# Patient Record
Sex: MALE | Race: WHITE | NOT HISPANIC OR LATINO | Employment: UNEMPLOYED | URBAN - METROPOLITAN AREA
[De-identification: names, ages, dates, MRNs, and addresses within clinical notes are randomized per-mention and may not be internally consistent; named-entity substitution may affect disease eponyms.]

---

## 2017-02-13 ENCOUNTER — TELEPHONE (OUTPATIENT)
Dept: INTERNAL MEDICINE | Facility: CLINIC | Age: 67
End: 2017-02-13

## 2017-02-13 ENCOUNTER — OFFICE VISIT (OUTPATIENT)
Dept: INTERNAL MEDICINE | Facility: CLINIC | Age: 67
End: 2017-02-13

## 2017-02-13 VITALS
HEIGHT: 66 IN | HEART RATE: 96 BPM | SYSTOLIC BLOOD PRESSURE: 116 MMHG | DIASTOLIC BLOOD PRESSURE: 64 MMHG | WEIGHT: 156.8 LBS | OXYGEN SATURATION: 98 % | BODY MASS INDEX: 25.2 KG/M2

## 2017-02-13 DIAGNOSIS — I10 BENIGN ESSENTIAL HYPERTENSION: Primary | ICD-10-CM

## 2017-02-13 DIAGNOSIS — N18.9 CHRONIC KIDNEY DISEASE, UNSPECIFIED STAGE: ICD-10-CM

## 2017-02-13 DIAGNOSIS — M17.11 OSTEOARTHRITIS OF RIGHT KNEE, UNSPECIFIED OSTEOARTHRITIS TYPE: ICD-10-CM

## 2017-02-13 DIAGNOSIS — L98.9 SKIN LESION: ICD-10-CM

## 2017-02-13 PROCEDURE — 99213 OFFICE O/P EST LOW 20 MIN: CPT | Performed by: INTERNAL MEDICINE

## 2017-02-13 PROCEDURE — G0008 ADMIN INFLUENZA VIRUS VAC: HCPCS | Performed by: INTERNAL MEDICINE

## 2017-02-13 PROCEDURE — 90686 IIV4 VACC NO PRSV 0.5 ML IM: CPT | Performed by: INTERNAL MEDICINE

## 2017-02-13 RX ORDER — OMEPRAZOLE 20 MG/1
20 CAPSULE, DELAYED RELEASE ORAL DAILY
Qty: 180 CAPSULE | Refills: 1 | Status: SHIPPED | OUTPATIENT
Start: 2017-02-13 | End: 2017-02-13 | Stop reason: SDUPTHER

## 2017-02-13 RX ORDER — AMLODIPINE BESYLATE 5 MG/1
1 TABLET ORAL DAILY
COMMUNITY
Start: 2016-11-22 | End: 2017-02-13 | Stop reason: SDUPTHER

## 2017-02-13 RX ORDER — MELOXICAM 7.5 MG/1
7.5 TABLET ORAL DAILY
Qty: 90 TABLET | Refills: 1 | Status: SHIPPED | OUTPATIENT
Start: 2017-02-13 | End: 2017-08-08 | Stop reason: SDUPTHER

## 2017-02-13 RX ORDER — OMEPRAZOLE 20 MG/1
20 CAPSULE, DELAYED RELEASE ORAL DAILY
Qty: 90 CAPSULE | Refills: 1 | Status: SHIPPED | OUTPATIENT
Start: 2017-02-13 | End: 2017-08-08 | Stop reason: SDUPTHER

## 2017-02-13 RX ORDER — DIAZEPAM 2 MG/1
2 TABLET ORAL AS NEEDED
Refills: 0
Start: 2017-02-13

## 2017-02-13 RX ORDER — AMLODIPINE BESYLATE 5 MG/1
5 TABLET ORAL DAILY
Qty: 90 TABLET | Refills: 1 | Status: SHIPPED | OUTPATIENT
Start: 2017-02-13 | End: 2017-08-08 | Stop reason: SDUPTHER

## 2017-02-13 NOTE — PROGRESS NOTES
"Hypertension    Subjective   Anoop Morrison is a 66 y.o. male is here today for follow-up.    History of Present Illness   Anoop is accompanied by his sister, who reports he has been doing well. No recent illness. Also followed by Dr. jacosb for hsis Schizophrenia.  She does note a dark lesion on his abdominal wall and would like to have it checked.  BP has been stable at home.    Current Outpatient Prescriptions:   •  fluticasone (FLONASE) 50 MCG/ACT nasal spray, 2 sprays into each nostril daily., Disp: 1 each, Rfl: 5  •  meloxicam (MOBIC) 7.5 MG tablet, Take 1 tablet by mouth Daily., Disp: 90 tablet, Rfl: 1  •  thioridazine (MELLARIL) 50 MG tablet, , Disp: , Rfl:   •  amLODIPine (NORVASC) 5 MG tablet, Take 1 tablet by mouth Daily., Disp: 90 tablet, Rfl: 1  •  diazePAM (VALIUM) 2 MG tablet, Take 1 tablet by mouth As Needed for anxiety., Disp: , Rfl: 0  •  omeprazole (priLOSEC) 20 MG capsule, Take 1 capsule by mouth Daily., Disp: 90 capsule, Rfl: 1      The following portions of the patient's history were reviewed and updated as appropriate: allergies, current medications, past family history, past medical history, past social history, past surgical history and problem list.    Review of Systems   Constitutional: Negative.  Negative for chills and fever.   HENT: Negative for ear discharge and ear pain.    Respiratory: Negative for cough, chest tightness and shortness of breath.    Cardiovascular: Negative for chest pain, palpitations and leg swelling.   Gastrointestinal: Negative for diarrhea, nausea and vomiting.   Musculoskeletal: Negative for arthralgias, back pain and myalgias.   Skin:        Skin lesion   Neurological: Negative for dizziness, syncope and headaches.   Psychiatric/Behavioral: Negative for confusion and sleep disturbance. The patient is nervous/anxious.        Objective   Visit Vitals   • /64   • Pulse 96   • Ht 66\" (167.6 cm)   • Wt 156 lb 12.8 oz (71.1 kg)   • SpO2 98%  Comment: ra   • " BMI 25.31 kg/m2     Physical Exam   Constitutional: He is oriented to person, place, and time. He appears well-developed and well-nourished.   HENT:   Head: Normocephalic and atraumatic.   Mouth/Throat: No oropharyngeal exudate.   Eyes: Conjunctivae are normal. Pupils are equal, round, and reactive to light.   Neck: Neck supple. No thyromegaly present.   Cardiovascular: Normal rate and regular rhythm.  Exam reveals no friction rub.    No murmur heard.  Pulmonary/Chest: Effort normal and breath sounds normal. He has no wheezes. He has no rales.   Abdominal: Soft. Bowel sounds are normal. He exhibits no distension. There is no tenderness.   Neurological: He is alert and oriented to person, place, and time. No cranial nerve deficit.   Skin: Skin is warm and dry.   1cm dark raised skin tag like lesion    Psychiatric: He has a normal mood and affect. Judgment normal.   Nursing note and vitals reviewed.        Results for orders placed or performed in visit on 08/22/16   Comprehensive metabolic panel   Result Value Ref Range    Glucose 101 (H) 70 - 100 mg/dL    BUN 11 9 - 23 mg/dL    Creatinine 1.20 0.60 - 1.30 mg/dL    Sodium 130 (L) 132 - 146 mmol/L    Potassium 4.2 3.5 - 5.5 mmol/L    Chloride 90 (L) 99 - 109 mmol/L    CO2 35.0 (H) 20.0 - 31.0 mmol/L    Calcium 9.8 8.7 - 10.4 mg/dL    Total Protein 7.2 5.7 - 8.2 g/dL    Albumin 4.50 3.20 - 4.80 g/dL    ALT (SGPT) 8 7 - 40 U/L    AST (SGOT) 13 0 - 33 U/L    Alkaline Phosphatase 70 25 - 100 U/L    Total Bilirubin 0.4 0.3 - 1.2 mg/dL    eGFR Non African Amer 61 >60 mL/min/1.73    Globulin 2.7 gm/dL    A/G Ratio 1.7 g/dL    BUN/Creatinine Ratio 9.2 7.0 - 25.0    Anion Gap 5.0 3.0 - 11.0 mmol/L   CBC (No diff)   Result Value Ref Range    WBC 6.26 3.50 - 10.80 10*3/mm3    RBC 4.28 4.20 - 5.76 10*6/mm3    Hemoglobin 12.9 (L) 13.1 - 17.5 g/dL    Hematocrit 37.4 (L) 38.9 - 50.9 %    MCV 87.4 80.0 - 99.0 fL    MCH 30.1 27.0 - 31.0 pg    MCHC 34.5 32.0 - 36.0 g/dL    RDW 12.8  11.3 - 14.5 %    RDW-SD 40.9 37.0 - 54.0 fl    MPV 9.2 6.0 - 12.0 fL    Platelets 274 150 - 450 10*3/mm3   TSH   Result Value Ref Range    TSH 2.101 0.350 - 5.350 mIU/mL             Assessment/Plan   Diagnoses and all orders for this visit:    Benign essential hypertension  -     amLODIPine (NORVASC) 5 MG tablet; Take 1 tablet by mouth Daily.    Osteoarthritis of right knee, unspecified osteoarthritis type  -     meloxicam (MOBIC) 7.5 MG tablet; Take 1 tablet by mouth Daily.    Chronic kidney disease, unspecified stage    Skin lesion  -     Ambulatory Referral to Dermatology    Other orders  -     Discontinue: amLODIPine (NORVASC) 5 MG tablet; Take 1 tablet by mouth Daily.  -     Flu Vaccine Split Quad  -     Discontinue: omeprazole (priLOSEC) 20 MG capsule; Take 1 capsule by mouth Daily.  -     diazePAM (VALIUM) 2 MG tablet; Take 1 tablet by mouth As Needed for anxiety.    Abdominal wall lesion, worrisome for Melanoma. Proceed with derm referral.             Return in about 6 months (around 8/13/2017) for Next scheduled follow up.

## 2017-02-13 NOTE — TELEPHONE ENCOUNTER
Received a call from [facility] pharmacy wanting to clarify wether the pt is supposed to be taking 1 tablet once a day of Omeprazole or did Dr. Ulrich meant to write it 1 tablet twice a day, Dr. Sandoval pls clarify sig for Omeprazole.

## 2017-03-22 RX ORDER — FLUTICASONE PROPIONATE 50 MCG
SPRAY, SUSPENSION (ML) NASAL
Qty: 16 G | Refills: 3 | Status: SHIPPED | OUTPATIENT
Start: 2017-03-22 | End: 2017-08-08 | Stop reason: SDUPTHER

## 2017-08-08 ENCOUNTER — OFFICE VISIT (OUTPATIENT)
Dept: INTERNAL MEDICINE | Facility: CLINIC | Age: 67
End: 2017-08-08

## 2017-08-08 VITALS
DIASTOLIC BLOOD PRESSURE: 68 MMHG | BODY MASS INDEX: 27.28 KG/M2 | OXYGEN SATURATION: 98 % | HEART RATE: 74 BPM | WEIGHT: 169 LBS | SYSTOLIC BLOOD PRESSURE: 116 MMHG

## 2017-08-08 DIAGNOSIS — N18.9 CHRONIC KIDNEY DISEASE, UNSPECIFIED STAGE: ICD-10-CM

## 2017-08-08 DIAGNOSIS — Z11.59 NEED FOR HEPATITIS C SCREENING TEST: ICD-10-CM

## 2017-08-08 DIAGNOSIS — M17.11 OSTEOARTHRITIS OF RIGHT KNEE, UNSPECIFIED OSTEOARTHRITIS TYPE: ICD-10-CM

## 2017-08-08 DIAGNOSIS — J30.9 ALLERGIC RHINITIS, UNSPECIFIED ALLERGIC RHINITIS TRIGGER, UNSPECIFIED RHINITIS SEASONALITY: ICD-10-CM

## 2017-08-08 DIAGNOSIS — K21.9 GASTROESOPHAGEAL REFLUX DISEASE, ESOPHAGITIS PRESENCE NOT SPECIFIED: Primary | ICD-10-CM

## 2017-08-08 DIAGNOSIS — I10 BENIGN ESSENTIAL HYPERTENSION: ICD-10-CM

## 2017-08-08 LAB
ALBUMIN SERPL-MCNC: 4.4 G/DL (ref 3.2–4.8)
ALBUMIN/GLOB SERPL: 1.6 G/DL (ref 1.5–2.5)
ALP SERPL-CCNC: 90 U/L (ref 25–100)
ALT SERPL W P-5'-P-CCNC: 14 U/L (ref 7–40)
ANION GAP SERPL CALCULATED.3IONS-SCNC: 6 MMOL/L (ref 3–11)
AST SERPL-CCNC: 21 U/L (ref 0–33)
BILIRUB SERPL-MCNC: 0.4 MG/DL (ref 0.3–1.2)
BUN BLD-MCNC: 17 MG/DL (ref 9–23)
BUN/CREAT SERPL: 11.3 (ref 7–25)
CALCIUM SPEC-SCNC: 9.7 MG/DL (ref 8.7–10.4)
CHLORIDE SERPL-SCNC: 100 MMOL/L (ref 99–109)
CHOLEST SERPL-MCNC: 191 MG/DL (ref 0–200)
CO2 SERPL-SCNC: 31 MMOL/L (ref 20–31)
CREAT BLD-MCNC: 1.5 MG/DL (ref 0.6–1.3)
DEPRECATED RDW RBC AUTO: 40.6 FL (ref 37–54)
ERYTHROCYTE [DISTWIDTH] IN BLOOD BY AUTOMATED COUNT: 12.5 % (ref 11.3–14.5)
GFR SERPL CREATININE-BSD FRML MDRD: 47 ML/MIN/1.73
GLOBULIN UR ELPH-MCNC: 2.8 GM/DL
GLUCOSE BLD-MCNC: 87 MG/DL (ref 70–100)
HCT VFR BLD AUTO: 38.5 % (ref 38.9–50.9)
HCV AB SER DONR QL: NORMAL
HGB BLD-MCNC: 13.3 G/DL (ref 13.1–17.5)
MCH RBC QN AUTO: 30.5 PG (ref 27–31)
MCHC RBC AUTO-ENTMCNC: 34.5 G/DL (ref 32–36)
MCV RBC AUTO: 88.3 FL (ref 80–99)
PLATELET # BLD AUTO: 221 10*3/MM3 (ref 150–450)
PMV BLD AUTO: 10 FL (ref 6–12)
POTASSIUM BLD-SCNC: 4.2 MMOL/L (ref 3.5–5.5)
PROT SERPL-MCNC: 7.2 G/DL (ref 5.7–8.2)
RBC # BLD AUTO: 4.36 10*6/MM3 (ref 4.2–5.76)
SODIUM BLD-SCNC: 137 MMOL/L (ref 132–146)
TSH SERPL DL<=0.05 MIU/L-ACNC: 2.93 MIU/ML (ref 0.35–5.35)
WBC NRBC COR # BLD: 4.94 10*3/MM3 (ref 3.5–10.8)

## 2017-08-08 PROCEDURE — 84443 ASSAY THYROID STIM HORMONE: CPT | Performed by: PHYSICIAN ASSISTANT

## 2017-08-08 PROCEDURE — 85027 COMPLETE CBC AUTOMATED: CPT | Performed by: PHYSICIAN ASSISTANT

## 2017-08-08 PROCEDURE — 99214 OFFICE O/P EST MOD 30 MIN: CPT | Performed by: PHYSICIAN ASSISTANT

## 2017-08-08 PROCEDURE — 82465 ASSAY BLD/SERUM CHOLESTEROL: CPT | Performed by: PHYSICIAN ASSISTANT

## 2017-08-08 PROCEDURE — 80053 COMPREHEN METABOLIC PANEL: CPT | Performed by: PHYSICIAN ASSISTANT

## 2017-08-08 PROCEDURE — 86803 HEPATITIS C AB TEST: CPT | Performed by: PHYSICIAN ASSISTANT

## 2017-08-08 RX ORDER — FLUTICASONE PROPIONATE 50 MCG
2 SPRAY, SUSPENSION (ML) NASAL DAILY
Qty: 16 G | Refills: 5 | Status: SHIPPED | OUTPATIENT
Start: 2017-08-08

## 2017-08-08 RX ORDER — MELOXICAM 7.5 MG/1
7.5 TABLET ORAL DAILY
Qty: 90 TABLET | Refills: 1 | Status: SHIPPED | OUTPATIENT
Start: 2017-08-08 | End: 2018-02-20 | Stop reason: SDUPTHER

## 2017-08-08 RX ORDER — AMLODIPINE BESYLATE 5 MG/1
5 TABLET ORAL DAILY
Qty: 90 TABLET | Refills: 1 | Status: SHIPPED | OUTPATIENT
Start: 2017-08-08 | End: 2018-02-20 | Stop reason: SDUPTHER

## 2017-08-08 RX ORDER — OMEPRAZOLE 20 MG/1
20 CAPSULE, DELAYED RELEASE ORAL DAILY
Qty: 90 CAPSULE | Refills: 1 | Status: SHIPPED | OUTPATIENT
Start: 2017-08-08 | End: 2018-02-20 | Stop reason: SDUPTHER

## 2017-08-08 NOTE — PROGRESS NOTES
Chief Complaint   Patient presents with   • Follow-up     hypertension       Subjective   Anoop Morrison is a 66 y.o. male.       History of Present Illness     Pt is stable on current medications. Taking amlodipine as directed. No HAs, chest pain, shortness of breath or palps.     Needs refill of flonase, uses it most days to help with allergic rhinitis and nasal congestion.    Still seeing his psychiatrist regularly and stable on meds. Does not get out of his apartment much except for office visits. Walks some for exercise.    Current Outpatient Prescriptions:   •  amLODIPine (NORVASC) 5 MG tablet, Take 1 tablet by mouth Daily., Disp: 90 tablet, Rfl: 1  •  fluticasone (FLONASE) 50 MCG/ACT nasal spray, 2 sprays into each nostril Daily., Disp: 16 g, Rfl: 5  •  meloxicam (MOBIC) 7.5 MG tablet, Take 1 tablet by mouth Daily., Disp: 90 tablet, Rfl: 1  •  omeprazole (priLOSEC) 20 MG capsule, Take 1 capsule by mouth Daily., Disp: 90 capsule, Rfl: 1  •  diazePAM (VALIUM) 2 MG tablet, Take 1 tablet by mouth As Needed for anxiety., Disp: , Rfl: 0  •  thioridazine (MELLARIL) 50 MG tablet, , Disp: , Rfl:      PMFSH  The following portions of the patient's history were reviewed and updated as appropriate: allergies, current medications, past family history, past medical history, past social history, past surgical history and problem list.    Review of Systems   Constitutional: Negative for appetite change, fever and unexpected weight change.   HENT: Negative for ear pain, facial swelling and sore throat.    Eyes: Negative for pain and visual disturbance.   Respiratory: Negative for chest tightness, shortness of breath and wheezing.    Cardiovascular: Negative for chest pain and palpitations.   Gastrointestinal: Negative for abdominal pain and blood in stool.   Endocrine: Negative.    Genitourinary: Negative for difficulty urinating and hematuria.   Musculoskeletal: Negative for joint swelling.   Neurological: Negative for  tremors, seizures and syncope.   Hematological: Negative for adenopathy.   Psychiatric/Behavioral: Negative.        Objective   /68  Pulse 74  Wt 169 lb (76.7 kg)  SpO2 98%  BMI 27.28 kg/m2    Physical Exam   Constitutional: He appears well-developed and well-nourished.   HENT:   Head: Normocephalic.   Right Ear: Hearing, tympanic membrane, external ear and ear canal normal.   Left Ear: Hearing, tympanic membrane, external ear and ear canal normal.   Nose: Nose normal.   Mouth/Throat: Oropharynx is clear and moist.   Eyes: Conjunctivae are normal. Pupils are equal, round, and reactive to light.   Neck: Normal range of motion.   Cardiovascular: Normal rate, regular rhythm and normal heart sounds.    Pulmonary/Chest: Effort normal and breath sounds normal. He has no decreased breath sounds. He has no wheezes. He has no rhonchi. He has no rales.   Musculoskeletal: Normal range of motion.   Neurological: He is alert.   Skin: Skin is warm and dry.   Psychiatric: He has a normal mood and affect. His behavior is normal.   Nursing note and vitals reviewed.      Results for orders placed or performed in visit on 08/08/17   Comprehensive Metabolic Panel   Result Value Ref Range    Glucose 87 70 - 100 mg/dL    BUN 17 9 - 23 mg/dL    Creatinine 1.50 (H) 0.60 - 1.30 mg/dL    Sodium 137 132 - 146 mmol/L    Potassium 4.2 3.5 - 5.5 mmol/L    Chloride 100 99 - 109 mmol/L    CO2 31.0 20.0 - 31.0 mmol/L    Calcium 9.7 8.7 - 10.4 mg/dL    Total Protein 7.2 5.7 - 8.2 g/dL    Albumin 4.40 3.20 - 4.80 g/dL    ALT (SGPT) 14 7 - 40 U/L    AST (SGOT) 21 0 - 33 U/L    Alkaline Phosphatase 90 25 - 100 U/L    Total Bilirubin 0.4 0.3 - 1.2 mg/dL    eGFR Non African Amer 47 (L) >60 mL/min/1.73    Globulin 2.8 gm/dL    A/G Ratio 1.6 1.5 - 2.5 g/dL    BUN/Creatinine Ratio 11.3 7.0 - 25.0    Anion Gap 6.0 3.0 - 11.0 mmol/L   CBC (No Diff)   Result Value Ref Range    WBC 4.94 3.50 - 10.80 10*3/mm3    RBC 4.36 4.20 - 5.76 10*6/mm3     Hemoglobin 13.3 13.1 - 17.5 g/dL    Hematocrit 38.5 (L) 38.9 - 50.9 %    MCV 88.3 80.0 - 99.0 fL    MCH 30.5 27.0 - 31.0 pg    MCHC 34.5 32.0 - 36.0 g/dL    RDW 12.5 11.3 - 14.5 %    RDW-SD 40.6 37.0 - 54.0 fl    MPV 10.0 6.0 - 12.0 fL    Platelets 221 150 - 450 10*3/mm3   TSH   Result Value Ref Range    TSH 2.927 0.350 - 5.350 mIU/mL   Hepatitis C Antibody   Result Value Ref Range    Hepatitis C Ab Non-Reactive Non-Reactive   Cholesterol, Total   Result Value Ref Range    Total Cholesterol 191 0 - 200 mg/dL        ASSESSMENT/PLAN    Problem List Items Addressed This Visit        Cardiovascular and Mediastinum    Benign essential hypertension    Relevant Medications    amLODIPine (NORVASC) 5 MG tablet    Other Relevant Orders    Comprehensive Metabolic Panel (Completed)    TSH (Completed)    Cholesterol, Total (Completed)       Digestive    Acid reflux - Primary    Relevant Medications    omeprazole (priLOSEC) 20 MG capsule    Other Relevant Orders    CBC (No Diff) (Completed)       Musculoskeletal and Integument    Osteoarthritis of knee    Relevant Medications    meloxicam (MOBIC) 7.5 MG tablet    Other Relevant Orders    CBC (No Diff) (Completed)       Genitourinary    Chronic kidney disease      Other Visit Diagnoses     Allergic rhinitis, unspecified allergic rhinitis trigger, unspecified rhinitis seasonality        Relevant Medications    fluticasone (FLONASE) 50 MCG/ACT nasal spray    Need for hepatitis C screening test        Relevant Orders    Hepatitis C Antibody (Completed)               Return in about 6 months (around 2/8/2018) for Medicare Wellness with Dr Sandoval.

## 2018-02-20 ENCOUNTER — OFFICE VISIT (OUTPATIENT)
Dept: INTERNAL MEDICINE | Facility: CLINIC | Age: 68
End: 2018-02-20

## 2018-02-20 VITALS
DIASTOLIC BLOOD PRESSURE: 68 MMHG | SYSTOLIC BLOOD PRESSURE: 128 MMHG | BODY MASS INDEX: 29.76 KG/M2 | WEIGHT: 184.4 LBS | OXYGEN SATURATION: 97 % | HEART RATE: 90 BPM

## 2018-02-20 DIAGNOSIS — I10 BENIGN ESSENTIAL HYPERTENSION: ICD-10-CM

## 2018-02-20 DIAGNOSIS — Z12.11 SCREENING FOR COLON CANCER: Primary | ICD-10-CM

## 2018-02-20 DIAGNOSIS — K21.9 GASTROESOPHAGEAL REFLUX DISEASE, ESOPHAGITIS PRESENCE NOT SPECIFIED: ICD-10-CM

## 2018-02-20 DIAGNOSIS — M17.11 OSTEOARTHRITIS OF RIGHT KNEE, UNSPECIFIED OSTEOARTHRITIS TYPE: ICD-10-CM

## 2018-02-20 LAB
ALBUMIN SERPL-MCNC: 4.5 G/DL (ref 3.2–4.8)
ALBUMIN/GLOB SERPL: 2 G/DL (ref 1.5–2.5)
ALP SERPL-CCNC: 102 U/L (ref 25–100)
ALT SERPL W P-5'-P-CCNC: 28 U/L (ref 7–40)
ANION GAP SERPL CALCULATED.3IONS-SCNC: 8 MMOL/L (ref 3–11)
ARTICHOKE IGE QN: 97 MG/DL (ref 0–130)
AST SERPL-CCNC: 23 U/L (ref 0–33)
BILIRUB SERPL-MCNC: 0.3 MG/DL (ref 0.3–1.2)
BUN BLD-MCNC: 18 MG/DL (ref 9–23)
BUN/CREAT SERPL: 12.9 (ref 7–25)
CALCIUM SPEC-SCNC: 9.8 MG/DL (ref 8.7–10.4)
CHLORIDE SERPL-SCNC: 98 MMOL/L (ref 99–109)
CHOLEST SERPL-MCNC: 196 MG/DL (ref 0–200)
CO2 SERPL-SCNC: 32 MMOL/L (ref 20–31)
CREAT BLD-MCNC: 1.4 MG/DL (ref 0.6–1.3)
GFR SERPL CREATININE-BSD FRML MDRD: 51 ML/MIN/1.73
GLOBULIN UR ELPH-MCNC: 2.3 GM/DL
GLUCOSE BLD-MCNC: 107 MG/DL (ref 70–100)
HDLC SERPL-MCNC: 44 MG/DL (ref 40–60)
POTASSIUM BLD-SCNC: 4.2 MMOL/L (ref 3.5–5.5)
PROT SERPL-MCNC: 6.8 G/DL (ref 5.7–8.2)
SODIUM BLD-SCNC: 138 MMOL/L (ref 132–146)
TRIGL SERPL-MCNC: 343 MG/DL (ref 0–150)

## 2018-02-20 PROCEDURE — 80061 LIPID PANEL: CPT | Performed by: INTERNAL MEDICINE

## 2018-02-20 PROCEDURE — 80053 COMPREHEN METABOLIC PANEL: CPT | Performed by: INTERNAL MEDICINE

## 2018-02-20 PROCEDURE — G0008 ADMIN INFLUENZA VIRUS VAC: HCPCS | Performed by: INTERNAL MEDICINE

## 2018-02-20 PROCEDURE — 99214 OFFICE O/P EST MOD 30 MIN: CPT | Performed by: INTERNAL MEDICINE

## 2018-02-20 PROCEDURE — 90662 IIV NO PRSV INCREASED AG IM: CPT | Performed by: INTERNAL MEDICINE

## 2018-02-20 RX ORDER — OMEPRAZOLE 20 MG/1
20 CAPSULE, DELAYED RELEASE ORAL DAILY
Qty: 90 CAPSULE | Refills: 1 | Status: SHIPPED | OUTPATIENT
Start: 2018-02-20

## 2018-02-20 RX ORDER — AMLODIPINE BESYLATE 5 MG/1
5 TABLET ORAL DAILY
Qty: 90 TABLET | Refills: 1 | Status: SHIPPED | OUTPATIENT
Start: 2018-02-20

## 2018-02-20 RX ORDER — MELOXICAM 7.5 MG/1
7.5 TABLET ORAL DAILY
Qty: 90 TABLET | Refills: 1 | Status: SHIPPED | OUTPATIENT
Start: 2018-02-20

## 2018-02-20 NOTE — PROGRESS NOTES
Med Refill    Subjective   Anoop Morrison is a 67 y.o. male is here today for follow-up.    History of Present Illness   Here for refills on his HTN, and arthritis.Doing well on current regimen. Not fasting today.  Takes his valium only on days he has to come outside.  Seen by [facility] Xie for his Psych meds.  Needs his flu shot.      Current Outpatient Prescriptions:   •  amLODIPine (NORVASC) 5 MG tablet, Take 1 tablet by mouth Daily., Disp: 90 tablet, Rfl: 1  •  diazePAM (VALIUM) 2 MG tablet, Take 1 tablet by mouth As Needed for anxiety., Disp: , Rfl: 0  •  fluticasone (FLONASE) 50 MCG/ACT nasal spray, 2 sprays into each nostril Daily., Disp: 16 g, Rfl: 5  •  meloxicam (MOBIC) 7.5 MG tablet, Take 1 tablet by mouth Daily., Disp: 90 tablet, Rfl: 1  •  omeprazole (priLOSEC) 20 MG capsule, Take 1 capsule by mouth Daily., Disp: 90 capsule, Rfl: 1  •  thioridazine (MELLARIL) 50 MG tablet, , Disp: , Rfl:       The following portions of the patient's history were reviewed and updated as appropriate: allergies, current medications, past family history, past medical history, past social history, past surgical history and problem list.    Review of Systems   Constitutional: Negative.  Negative for chills and fever.   HENT: Negative for ear discharge, ear pain, sinus pressure and sore throat.    Respiratory: Negative for cough, chest tightness and shortness of breath.    Cardiovascular: Negative for chest pain, palpitations and leg swelling.   Gastrointestinal: Positive for abdominal pain. Negative for diarrhea, nausea and vomiting.   Musculoskeletal: Positive for arthralgias and gait problem. Negative for back pain and myalgias.   Neurological: Negative for dizziness, syncope and headaches.   Psychiatric/Behavioral: Negative for confusion and sleep disturbance.       Objective   /68 (BP Location: Right arm, Patient Position: Sitting, Cuff Size: Adult)  Pulse 90  Wt 83.6 kg (184 lb 6.4 oz)  SpO2 97%  BMI 29.76  kg/m2  Physical Exam   Constitutional: He is oriented to person, place, and time. He appears well-developed and well-nourished.   HENT:   Head: Normocephalic and atraumatic.   Mouth/Throat: No oropharyngeal exudate.   Eyes: Conjunctivae are normal. Pupils are equal, round, and reactive to light.   Neck: Neck supple. No thyromegaly present.   Cardiovascular: Normal rate and regular rhythm.    Pulmonary/Chest: Effort normal and breath sounds normal.   Abdominal: Soft. Bowel sounds are normal. He exhibits no distension. There is no tenderness.   Musculoskeletal: He exhibits no edema.   Neurological: He is alert and oriented to person, place, and time. No cranial nerve deficit.   Skin: Skin is warm and dry.   Psychiatric: He has a normal mood and affect. Judgment normal.   Nursing note and vitals reviewed.        Results for orders placed or performed in visit on 02/20/18   Comprehensive Metabolic Panel   Result Value Ref Range    Glucose 107 (H) 70 - 100 mg/dL    BUN 18 9 - 23 mg/dL    Creatinine 1.40 (H) 0.60 - 1.30 mg/dL    Sodium 138 132 - 146 mmol/L    Potassium 4.2 3.5 - 5.5 mmol/L    Chloride 98 (L) 99 - 109 mmol/L    CO2 32.0 (H) 20.0 - 31.0 mmol/L    Calcium 9.8 8.7 - 10.4 mg/dL    Total Protein 6.8 5.7 - 8.2 g/dL    Albumin 4.50 3.20 - 4.80 g/dL    ALT (SGPT) 28 7 - 40 U/L    AST (SGOT) 23 0 - 33 U/L    Alkaline Phosphatase 102 (H) 25 - 100 U/L    Total Bilirubin 0.3 0.3 - 1.2 mg/dL    eGFR Non African Amer 51 (L) >60 mL/min/1.73    Globulin 2.3 gm/dL    A/G Ratio 2.0 1.5 - 2.5 g/dL    BUN/Creatinine Ratio 12.9 7.0 - 25.0    Anion Gap 8.0 3.0 - 11.0 mmol/L   Lipid Panel   Result Value Ref Range    Total Cholesterol 196 0 - 200 mg/dL    Triglycerides 343 (H) 0 - 150 mg/dL    HDL Cholesterol 44 40 - 60 mg/dL    LDL Cholesterol  97 0 - 130 mg/dL             Assessment/Plan   Diagnoses and all orders for this visit:    Screening for colon cancer  -     Cologuard - Stool, Per Rectum    Benign essential  hypertension  -     amLODIPine (NORVASC) 5 MG tablet; Take 1 tablet by mouth Daily.  -     Comprehensive Metabolic Panel  -     Lipid Panel    Osteoarthritis of right knee, unspecified osteoarthritis type  -     meloxicam (MOBIC) 7.5 MG tablet; Take 1 tablet by mouth Daily.    Gastroesophageal reflux disease, esophagitis presence not specified  -     omeprazole (priLOSEC) 20 MG capsule; Take 1 capsule by mouth Daily.    Other orders  -     Flu Vaccine High Dose PF 65YR+ ([id number])               Needs pneumovax in august  Return in about 6 months (around 8/20/2018) for Medicare Wellness.

## 2025-03-25 ENCOUNTER — TRANSCRIBE ORDERS (OUTPATIENT)
Dept: ADMINISTRATIVE | Facility: HOSPITAL | Age: 75
End: 2025-03-25
Payer: MEDICARE

## 2025-03-25 DIAGNOSIS — R91.1 LUNG NODULE: Primary | ICD-10-CM

## 2025-03-29 ENCOUNTER — APPOINTMENT (OUTPATIENT)
Dept: CT IMAGING | Facility: HOSPITAL | Age: 75
End: 2025-03-29
Payer: MEDICARE

## 2025-03-29 ENCOUNTER — APPOINTMENT (OUTPATIENT)
Dept: GENERAL RADIOLOGY | Facility: HOSPITAL | Age: 75
End: 2025-03-29
Payer: MEDICARE

## 2025-03-29 ENCOUNTER — HOSPITAL ENCOUNTER (INPATIENT)
Facility: HOSPITAL | Age: 75
LOS: 3 days | Discharge: NURSING, REHAB OR LONG-TERM CARE | End: 2025-04-01
Attending: EMERGENCY MEDICINE | Admitting: HOSPITALIST
Payer: MEDICARE

## 2025-03-29 DIAGNOSIS — J18.9 PNEUMONIA OF BOTH UPPER LOBES DUE TO INFECTIOUS ORGANISM: Primary | ICD-10-CM

## 2025-03-29 DIAGNOSIS — K52.9 GASTROENTERITIS: ICD-10-CM

## 2025-03-29 DIAGNOSIS — E86.9 VOLUME DEPLETION: ICD-10-CM

## 2025-03-29 DIAGNOSIS — R41.841 COGNITIVE COMMUNICATION DEFICIT: ICD-10-CM

## 2025-03-29 DIAGNOSIS — J30.9 ALLERGIC RHINITIS, UNSPECIFIED SEASONALITY, UNSPECIFIED TRIGGER: ICD-10-CM

## 2025-03-29 DIAGNOSIS — R53.83 LETHARGY: ICD-10-CM

## 2025-03-29 DIAGNOSIS — R41.0 ACUTE CONFUSION: ICD-10-CM

## 2025-03-29 DIAGNOSIS — E87.0 HYPERNATREMIA: ICD-10-CM

## 2025-03-29 PROBLEM — R41.82 AMS (ALTERED MENTAL STATUS): Status: ACTIVE | Noted: 2025-03-29

## 2025-03-29 LAB
ALT SERPL W P-5'-P-CCNC: 10 U/L (ref 1–41)
ANION GAP SERPL CALCULATED.3IONS-SCNC: 13 MMOL/L (ref 5–15)
APTT PPP: 30.2 SECONDS (ref 22–39)
ARTERIAL PATENCY WRIST A: ABNORMAL
AST SERPL-CCNC: 13 U/L (ref 1–40)
ATMOSPHERIC PRESS: ABNORMAL MM[HG]
BASE EXCESS BLDA CALC-SCNC: 7.8 MMOL/L (ref 0–2)
BASOPHILS # BLD AUTO: 0.02 10*3/MM3 (ref 0–0.2)
BASOPHILS NFR BLD AUTO: 0.3 % (ref 0–1.5)
BDY SITE: ABNORMAL
BODY TEMPERATURE: 37
BUN SERPL-MCNC: 36 MG/DL (ref 8–23)
BUN/CREAT SERPL: 37.9 (ref 7–25)
CALCIUM SPEC-SCNC: 8.8 MG/DL (ref 8.6–10.5)
CHLORIDE SERPL-SCNC: 106 MMOL/L (ref 98–107)
CO2 BLDA-SCNC: 37.2 MMOL/L (ref 22–33)
CO2 SERPL-SCNC: 31 MMOL/L (ref 22–29)
COHGB MFR BLD: 1.4 % (ref 0–2)
CREAT SERPL-MCNC: 0.95 MG/DL (ref 0.76–1.27)
D-LACTATE SERPL-SCNC: 1.3 MMOL/L (ref 0.5–2)
D-LACTATE SERPL-SCNC: 2.2 MMOL/L (ref 0.5–2)
DEPRECATED RDW RBC AUTO: 51.7 FL (ref 37–54)
EGFRCR SERPLBLD CKD-EPI 2021: 85 ML/MIN/1.73
EOSINOPHIL # BLD AUTO: 0 10*3/MM3 (ref 0–0.4)
EOSINOPHIL NFR BLD AUTO: 0 % (ref 0.3–6.2)
EPAP: 0
ERYTHROCYTE [DISTWIDTH] IN BLOOD BY AUTOMATED COUNT: 14.9 % (ref 12.3–15.4)
GLUCOSE SERPL-MCNC: 139 MG/DL (ref 65–99)
HCO3 BLDA-SCNC: 35.2 MMOL/L (ref 20–26)
HCT VFR BLD AUTO: 40.1 % (ref 37.5–51)
HCT VFR BLD CALC: 34.8 % (ref 38–51)
HGB BLD-MCNC: 12.4 G/DL (ref 13–17.7)
HGB BLDA-MCNC: 11.3 G/DL (ref 13.5–17.5)
HOLD SPECIMEN: NORMAL
IMM GRANULOCYTES # BLD AUTO: 0.02 10*3/MM3 (ref 0–0.05)
IMM GRANULOCYTES NFR BLD AUTO: 0.3 % (ref 0–0.5)
INHALED O2 CONCENTRATION: 28 %
INR PPP: 1.14 (ref 0.89–1.12)
IPAP: 0
LYMPHOCYTES # BLD AUTO: 0.31 10*3/MM3 (ref 0.7–3.1)
LYMPHOCYTES NFR BLD AUTO: 4.7 % (ref 19.6–45.3)
MCH RBC QN AUTO: 29 PG (ref 26.6–33)
MCHC RBC AUTO-ENTMCNC: 30.9 G/DL (ref 31.5–35.7)
MCV RBC AUTO: 93.9 FL (ref 79–97)
METHGB BLD QL: 0.3 % (ref 0–1.5)
MODALITY: ABNORMAL
MONOCYTES # BLD AUTO: 0.29 10*3/MM3 (ref 0.1–0.9)
MONOCYTES NFR BLD AUTO: 4.4 % (ref 5–12)
NEUTROPHILS NFR BLD AUTO: 5.94 10*3/MM3 (ref 1.7–7)
NEUTROPHILS NFR BLD AUTO: 90.3 % (ref 42.7–76)
NRBC BLD AUTO-RTO: 0 /100 WBC (ref 0–0.2)
OXYHGB MFR BLDV: 95.4 % (ref 94–99)
PAW @ PEAK INSP FLOW SETTING VENT: 0 CMH2O
PCO2 BLDA: 63.4 MM HG (ref 35–45)
PCO2 TEMP ADJ BLD: 63.4 MM HG (ref 35–48)
PH BLDA: 7.35 PH UNITS (ref 7.35–7.45)
PH, TEMP CORRECTED: 7.35 PH UNITS
PLATELET # BLD AUTO: 157 10*3/MM3 (ref 140–450)
PMV BLD AUTO: 10.6 FL (ref 6–12)
PO2 BLDA: 85.2 MM HG (ref 83–108)
PO2 TEMP ADJ BLD: 85.2 MM HG (ref 83–108)
POTASSIUM SERPL-SCNC: 4.7 MMOL/L (ref 3.5–5.2)
PROTHROMBIN TIME: 14.7 SECONDS (ref 12.2–14.5)
QT INTERVAL: 418 MS
QTC INTERVAL: 508 MS
RBC # BLD AUTO: 4.27 10*6/MM3 (ref 4.14–5.8)
SARS-COV-2 AG RESP QL IA.RAPID: NORMAL
SODIUM SERPL-SCNC: 150 MMOL/L (ref 136–145)
TOTAL RATE: 0 BREATHS/MINUTE
VALPROATE SERPL-MCNC: 12.5 MCG/ML (ref 50–125)
WBC NRBC COR # BLD AUTO: 6.58 10*3/MM3 (ref 3.4–10.8)
WHOLE BLOOD HOLD COAG: NORMAL
WHOLE BLOOD HOLD SPECIMEN: NORMAL

## 2025-03-29 PROCEDURE — 82565 ASSAY OF CREATININE: CPT

## 2025-03-29 PROCEDURE — 83605 ASSAY OF LACTIC ACID: CPT | Performed by: EMERGENCY MEDICINE

## 2025-03-29 PROCEDURE — 93005 ELECTROCARDIOGRAM TRACING: CPT | Performed by: EMERGENCY MEDICINE

## 2025-03-29 PROCEDURE — 25010000002 HEPARIN (PORCINE) PER 1000 UNITS: Performed by: INTERNAL MEDICINE

## 2025-03-29 PROCEDURE — 25810000003 SODIUM CHLORIDE 0.9 % SOLUTION: Performed by: INTERNAL MEDICINE

## 2025-03-29 PROCEDURE — 0042T HC CT CEREBRAL PERFUSION W/WO CONTRAST: CPT

## 2025-03-29 PROCEDURE — 25510000001 IOPAMIDOL PER 1 ML: Performed by: EMERGENCY MEDICINE

## 2025-03-29 PROCEDURE — 83050 HGB METHEMOGLOBIN QUAN: CPT

## 2025-03-29 PROCEDURE — 85025 COMPLETE CBC W/AUTO DIFF WBC: CPT | Performed by: EMERGENCY MEDICINE

## 2025-03-29 PROCEDURE — 4A03X5D MEASUREMENT OF ARTERIAL FLOW, INTRACRANIAL, EXTERNAL APPROACH: ICD-10-PCS | Performed by: HOSPITALIST

## 2025-03-29 PROCEDURE — 82375 ASSAY CARBOXYHB QUANT: CPT

## 2025-03-29 PROCEDURE — 99223 1ST HOSP IP/OBS HIGH 75: CPT | Performed by: INTERNAL MEDICINE

## 2025-03-29 PROCEDURE — 84450 TRANSFERASE (AST) (SGOT): CPT | Performed by: EMERGENCY MEDICINE

## 2025-03-29 PROCEDURE — 74177 CT ABD & PELVIS W/CONTRAST: CPT

## 2025-03-29 PROCEDURE — 87426 SARSCOV CORONAVIRUS AG IA: CPT | Performed by: INTERNAL MEDICINE

## 2025-03-29 PROCEDURE — 99285 EMERGENCY DEPT VISIT HI MDM: CPT

## 2025-03-29 PROCEDURE — 71045 X-RAY EXAM CHEST 1 VIEW: CPT

## 2025-03-29 PROCEDURE — 82805 BLOOD GASES W/O2 SATURATION: CPT

## 2025-03-29 PROCEDURE — 84460 ALANINE AMINO (ALT) (SGPT): CPT | Performed by: EMERGENCY MEDICINE

## 2025-03-29 PROCEDURE — 94799 UNLISTED PULMONARY SVC/PX: CPT

## 2025-03-29 PROCEDURE — 70496 CT ANGIOGRAPHY HEAD: CPT

## 2025-03-29 PROCEDURE — 80164 ASSAY DIPROPYLACETIC ACD TOT: CPT | Performed by: INTERNAL MEDICINE

## 2025-03-29 PROCEDURE — 25810000003 SODIUM CHLORIDE 0.9 % SOLUTION: Performed by: EMERGENCY MEDICINE

## 2025-03-29 PROCEDURE — 85610 PROTHROMBIN TIME: CPT | Performed by: EMERGENCY MEDICINE

## 2025-03-29 PROCEDURE — 36600 WITHDRAWAL OF ARTERIAL BLOOD: CPT

## 2025-03-29 PROCEDURE — 36415 COLL VENOUS BLD VENIPUNCTURE: CPT

## 2025-03-29 PROCEDURE — 70498 CT ANGIOGRAPHY NECK: CPT

## 2025-03-29 PROCEDURE — 99222 1ST HOSP IP/OBS MODERATE 55: CPT | Performed by: NURSE PRACTITIONER

## 2025-03-29 PROCEDURE — 85730 THROMBOPLASTIN TIME PARTIAL: CPT | Performed by: EMERGENCY MEDICINE

## 2025-03-29 PROCEDURE — 70450 CT HEAD/BRAIN W/O DYE: CPT

## 2025-03-29 PROCEDURE — 25010000002 CEFTRIAXONE PER 250 MG: Performed by: EMERGENCY MEDICINE

## 2025-03-29 PROCEDURE — 94660 CPAP INITIATION&MGMT: CPT

## 2025-03-29 PROCEDURE — 80048 BASIC METABOLIC PNL TOTAL CA: CPT | Performed by: EMERGENCY MEDICINE

## 2025-03-29 RX ORDER — ASPIRIN 81 MG/1
81 TABLET, CHEWABLE ORAL DAILY
Status: DISCONTINUED | OUTPATIENT
Start: 2025-03-29 | End: 2025-04-01 | Stop reason: HOSPADM

## 2025-03-29 RX ORDER — BISACODYL 10 MG
10 SUPPOSITORY, RECTAL RECTAL DAILY PRN
Status: DISCONTINUED | OUTPATIENT
Start: 2025-03-29 | End: 2025-04-01 | Stop reason: HOSPADM

## 2025-03-29 RX ORDER — BISACODYL 5 MG/1
5 TABLET, DELAYED RELEASE ORAL DAILY PRN
Status: DISCONTINUED | OUTPATIENT
Start: 2025-03-29 | End: 2025-04-01 | Stop reason: HOSPADM

## 2025-03-29 RX ORDER — SODIUM CHLORIDE 0.9 % (FLUSH) 0.9 %
10 SYRINGE (ML) INJECTION EVERY 12 HOURS SCHEDULED
Status: DISCONTINUED | OUTPATIENT
Start: 2025-03-29 | End: 2025-04-01 | Stop reason: HOSPADM

## 2025-03-29 RX ORDER — SODIUM CHLORIDE 0.9 % (FLUSH) 0.9 %
10 SYRINGE (ML) INJECTION AS NEEDED
Status: DISCONTINUED | OUTPATIENT
Start: 2025-03-29 | End: 2025-04-01 | Stop reason: HOSPADM

## 2025-03-29 RX ORDER — ACETAMINOPHEN 650 MG/1
650 SUPPOSITORY RECTAL EVERY 4 HOURS PRN
Status: DISCONTINUED | OUTPATIENT
Start: 2025-03-29 | End: 2025-04-01 | Stop reason: HOSPADM

## 2025-03-29 RX ORDER — IPRATROPIUM BROMIDE AND ALBUTEROL SULFATE 2.5; .5 MG/3ML; MG/3ML
3 SOLUTION RESPIRATORY (INHALATION) EVERY 6 HOURS PRN
Status: DISCONTINUED | OUTPATIENT
Start: 2025-03-29 | End: 2025-03-30 | Stop reason: SDUPTHER

## 2025-03-29 RX ORDER — ACETAMINOPHEN 160 MG/5ML
650 SOLUTION ORAL EVERY 4 HOURS PRN
Status: DISCONTINUED | OUTPATIENT
Start: 2025-03-29 | End: 2025-04-01 | Stop reason: HOSPADM

## 2025-03-29 RX ORDER — HEPARIN SODIUM 5000 [USP'U]/ML
5000 INJECTION, SOLUTION INTRAVENOUS; SUBCUTANEOUS EVERY 8 HOURS SCHEDULED
Status: DISCONTINUED | OUTPATIENT
Start: 2025-03-29 | End: 2025-04-01 | Stop reason: HOSPADM

## 2025-03-29 RX ORDER — ATORVASTATIN CALCIUM 40 MG/1
80 TABLET, FILM COATED ORAL NIGHTLY
Status: DISCONTINUED | OUTPATIENT
Start: 2025-03-29 | End: 2025-04-01 | Stop reason: HOSPADM

## 2025-03-29 RX ORDER — IOPAMIDOL 755 MG/ML
125 INJECTION, SOLUTION INTRAVASCULAR
Status: COMPLETED | OUTPATIENT
Start: 2025-03-29 | End: 2025-03-29

## 2025-03-29 RX ORDER — ACETAMINOPHEN 325 MG/1
650 TABLET ORAL EVERY 4 HOURS PRN
Status: DISCONTINUED | OUTPATIENT
Start: 2025-03-29 | End: 2025-04-01 | Stop reason: HOSPADM

## 2025-03-29 RX ORDER — ASPIRIN 300 MG/1
300 SUPPOSITORY RECTAL DAILY
Status: DISCONTINUED | OUTPATIENT
Start: 2025-03-29 | End: 2025-04-01 | Stop reason: HOSPADM

## 2025-03-29 RX ORDER — SODIUM CHLORIDE 9 MG/ML
75 INJECTION, SOLUTION INTRAVENOUS CONTINUOUS
Status: ACTIVE | OUTPATIENT
Start: 2025-03-29 | End: 2025-03-30

## 2025-03-29 RX ORDER — SODIUM CHLORIDE 9 MG/ML
40 INJECTION, SOLUTION INTRAVENOUS AS NEEDED
Status: DISCONTINUED | OUTPATIENT
Start: 2025-03-29 | End: 2025-04-01 | Stop reason: HOSPADM

## 2025-03-29 RX ORDER — POLYETHYLENE GLYCOL 3350 17 G/17G
17 POWDER, FOR SOLUTION ORAL DAILY PRN
Status: DISCONTINUED | OUTPATIENT
Start: 2025-03-29 | End: 2025-04-01 | Stop reason: HOSPADM

## 2025-03-29 RX ORDER — AMOXICILLIN 250 MG
2 CAPSULE ORAL 2 TIMES DAILY PRN
Status: DISCONTINUED | OUTPATIENT
Start: 2025-03-29 | End: 2025-04-01 | Stop reason: HOSPADM

## 2025-03-29 RX ADMIN — DOXYCYCLINE 100 MG: 100 INJECTION, POWDER, LYOPHILIZED, FOR SOLUTION INTRAVENOUS at 22:40

## 2025-03-29 RX ADMIN — ASPIRIN 300 MG: 300 SUPPOSITORY RECTAL at 20:23

## 2025-03-29 RX ADMIN — IOPAMIDOL 125 ML: 755 INJECTION, SOLUTION INTRAVENOUS at 19:19

## 2025-03-29 RX ADMIN — SODIUM CHLORIDE 1000 ML: 9 INJECTION, SOLUTION INTRAVENOUS at 21:18

## 2025-03-29 RX ADMIN — Medication 10 ML: at 22:43

## 2025-03-29 RX ADMIN — SODIUM CHLORIDE 1000 ML: 9 INJECTION, SOLUTION INTRAVENOUS at 19:44

## 2025-03-29 RX ADMIN — HEPARIN SODIUM 5000 UNITS: 5000 INJECTION INTRAVENOUS; SUBCUTANEOUS at 22:41

## 2025-03-29 RX ADMIN — CEFTRIAXONE 2000 MG: 2 INJECTION, POWDER, FOR SOLUTION INTRAMUSCULAR; INTRAVENOUS at 21:18

## 2025-03-29 RX ADMIN — SODIUM CHLORIDE 75 ML/HR: 9 INJECTION, SOLUTION INTRAVENOUS at 22:38

## 2025-03-29 NOTE — ED PROVIDER NOTES
Subjective   History of Present Illness  Mr. Morrison presents with weakness.  Staff at his facility felt he was more confused than usual.  He has been having vomiting and diarrhea.  Staff reports outbreak of norovirus on his floor.  He reports headache.      Review of Systems    Past Medical History:   Diagnosis Date    Acute sinusitis        Allergies   Allergen Reactions    Other        No past surgical history on file.    Family History   Problem Relation Age of Onset    Arthritis Other     Cancer Other     Heart attack Other     Diabetes Other     Hypertension Other     Stroke Other        Social History     Socioeconomic History    Marital status: Single   Tobacco Use    Smoking status: Never    Smokeless tobacco: Never   Substance and Sexual Activity    Alcohol use: No           Objective   Physical Exam  Vitals and nursing note reviewed.   Constitutional:       General: He is not in acute distress.     Appearance: Normal appearance.   HENT:      Head: Normocephalic and atraumatic.      Nose: Nose normal. No congestion or rhinorrhea.   Eyes:      General: No scleral icterus.     Conjunctiva/sclera: Conjunctivae normal.   Neck:      Comments: No JVD   Cardiovascular:      Rate and Rhythm: Normal rate and regular rhythm.      Heart sounds: No murmur heard.     No friction rub.   Pulmonary:      Effort: Pulmonary effort is normal.      Breath sounds: Normal breath sounds. No wheezing or rales.   Abdominal:      General: Bowel sounds are normal.      Palpations: Abdomen is soft.      Tenderness: There is no abdominal tenderness. There is no guarding or rebound.   Musculoskeletal:         General: No tenderness.      Cervical back: Normal range of motion and neck supple.      Right lower leg: No edema.      Left lower leg: No edema.   Skin:     General: Skin is warm and dry.      Coloration: Skin is not pale.      Findings: No erythema.   Neurological:      General: No focal deficit present.      Mental Status: He is  lethargic.      Motor: No weakness.      Coordination: Coordination normal.      Comments: Contracture of left upper extremity.  He is lethargic, he does answer questions but his speech is difficult to understand.   Psychiatric:         Thought Content: Thought content normal.         Procedures           ED Course  ED Course as of 03/30/25 0013   Sat Mar 29, 2025   1848 Saw him emergently in CT scan due to code stroke page.  Interviewed paramedics.  Evaluated Mr. Morrison.  Conferred with stroke navigator.  He has what appears to be chronic weakness of the left upper extremity which he acknowledges.  He has contracture.  Do not see any acute focal areas of weakness.  Staff reported to EMS that last known well was 9:00 this morning.  Not a candidate therefore for consideration of thrombolysis.  Will proceed with stroke workup.  He is tender in his abdomen, will scan his abdomen and pelvis at the same time [DT]   2017 CT scans unremarkable.  Raise possibility of pneumonia.  Awaiting radiologist interpretation of his chest x-ray.  IV fluids are infusing. [DT]   2046 Has completed fluids.  Saturations have improved.  Currently high 90s on 2 L.  He acknowledges that he does wear oxygen at home.  Tells me his nausea is better.  Does report cough and shortness of breath.  CT scan suggests upper lobe pneumonia as does chest x-ray.  Seems somewhat lethargic.  101/54.  Will give antibiotics for pneumonia as well as additional IV fluids.  Last the hospitalist to admit/observe [DT]      ED Course User Index  [DT] Diego Mcconnell MD                                Total (NIH Stroke Scale): 2                      Medical Decision Making  Ordered and interpreted multiple labs.  Saw him as a code stroke and CT.  Multiple conversations with stroke navigator.  Gave IV medications and had reevaluation and admitted him to the hospital    Problems Addressed:  Acute confusion: complicated acute illness or injury that poses a threat to life  or bodily functions  Gastroenteritis: complicated acute illness or injury  Hypernatremia: complicated acute illness or injury  Lethargy: complicated acute illness or injury that poses a threat to life or bodily functions  Pneumonia of both upper lobes due to infectious organism: complicated acute illness or injury that poses a threat to life or bodily functions  Volume depletion: complicated acute illness or injury that poses a threat to life or bodily functions    Amount and/or Complexity of Data Reviewed  Independent Historian: EMS  Labs: ordered. Decision-making details documented in ED Course.  Radiology: ordered. Decision-making details documented in ED Course.  ECG/medicine tests: ordered. Decision-making details documented in ED Course.    Risk  Prescription drug management.  Decision regarding hospitalization.        Final diagnoses:   Gastroenteritis   Volume depletion   Lethargy   Pneumonia of both upper lobes due to infectious organism   Hypernatremia   Acute confusion       ED Disposition  ED Disposition       ED Disposition   Decision to Admit    Condition   --    Comment   Level of Care: Telemetry [5]   Diagnosis: AMS (altered mental status) [[id number]]   Admitting Physician: CELSA KUMAR [[id number]]   Attending Physician: CELSA KUMAR [[id number]]   Certification: I Certify That Inpatient Hospital Services Are Medically Necessary For Greater Than 2 Midnights                 No follow-up provider specified.       Medication List      No changes were made to your prescriptions during this visit.            Diego Mcconnell MD  03/30/25 0013

## 2025-03-29 NOTE — LETTER
EMS Transport Request  For use at [facility] [place], [place], [place], and [place] only   Patient Name: Anoop Morrison : 1950   Weight:93 kg (205 lb 0.4 oz) [facility] BLS/ALS: BLS/ALS: BLS   Insurance: MEDICARE Auth End Date: NA   Pre-Cert #: D/C Summary complete:    Destination: Other [place] place   Contact Precautions: Other Contact spore for norovirus   Equipment (O2, Fluids, etc.): None   Arrive By Date/Time: this afternoon (still needs to poop before goes) Stretcher/WC: Stretcher   CM Requesting: Daya Wolff, MSW Ext: [phone]   Notes/Medical Necessity: fall risk,      ______________________________________________________________________    *Only 2 patient bags OR 1 carry-on size bag are permitted.  Wheelchairs and walkers CANNOT transported with the patient. Acknowledge: Yes

## 2025-03-30 ENCOUNTER — APPOINTMENT (OUTPATIENT)
Dept: GENERAL RADIOLOGY | Facility: HOSPITAL | Age: 75
End: 2025-03-30
Payer: MEDICARE

## 2025-03-30 ENCOUNTER — APPOINTMENT (OUTPATIENT)
Dept: MRI IMAGING | Facility: HOSPITAL | Age: 75
End: 2025-03-30
Payer: MEDICARE

## 2025-03-30 LAB
AMMONIA BLD-SCNC: 25 UMOL/L (ref 16–60)
ANION GAP SERPL CALCULATED.3IONS-SCNC: 10 MMOL/L (ref 5–15)
ARTERIAL PATENCY WRIST A: POSITIVE
ATMOSPHERIC PRESS: ABNORMAL MM[HG]
BASE EXCESS BLDA CALC-SCNC: 7 MMOL/L (ref 0–2)
BDY SITE: ABNORMAL
BODY TEMPERATURE: 37
BUN SERPL-MCNC: 28 MG/DL (ref 8–23)
BUN/CREAT SERPL: 40 (ref 7–25)
CALCIUM SPEC-SCNC: 7.9 MG/DL (ref 8.6–10.5)
CHLORIDE SERPL-SCNC: 112 MMOL/L (ref 98–107)
CHOLEST SERPL-MCNC: 80 MG/DL (ref 0–200)
CO2 BLDA-SCNC: 36.2 MMOL/L (ref 22–33)
CO2 SERPL-SCNC: 29 MMOL/L (ref 22–29)
COHGB MFR BLD: 1.3 % (ref 0–2)
CREAT SERPL-MCNC: 0.7 MG/DL (ref 0.76–1.27)
DEPRECATED RDW RBC AUTO: 52.4 FL (ref 37–54)
EGFRCR SERPLBLD CKD-EPI 2021: 97.9 ML/MIN/1.73
EPAP: 0
ERYTHROCYTE [DISTWIDTH] IN BLOOD BY AUTOMATED COUNT: 15 % (ref 12.3–15.4)
GLUCOSE BLDC GLUCOMTR-MCNC: 111 MG/DL (ref 70–130)
GLUCOSE BLDC GLUCOMTR-MCNC: 116 MG/DL (ref 70–130)
GLUCOSE BLDC GLUCOMTR-MCNC: 125 MG/DL (ref 70–130)
GLUCOSE BLDC GLUCOMTR-MCNC: 147 MG/DL (ref 70–130)
GLUCOSE BLDC GLUCOMTR-MCNC: 164 MG/DL (ref 70–130)
GLUCOSE SERPL-MCNC: 117 MG/DL (ref 65–99)
HBA1C MFR BLD: 5.7 % (ref 4.8–5.6)
HCO3 BLDA-SCNC: 34.3 MMOL/L (ref 20–26)
HCT VFR BLD AUTO: 36 % (ref 37.5–51)
HCT VFR BLD CALC: 34.7 % (ref 38–51)
HDLC SERPL-MCNC: 30 MG/DL (ref 40–60)
HGB BLD-MCNC: 11 G/DL (ref 13–17.7)
HGB BLDA-MCNC: 11.3 G/DL (ref 13.5–17.5)
INHALED O2 CONCENTRATION: 38 %
IPAP: 0
LDLC SERPL CALC-MCNC: 24 MG/DL (ref 0–100)
LDLC/HDLC SERPL: 0.63 {RATIO}
MCH RBC QN AUTO: 29.2 PG (ref 26.6–33)
MCHC RBC AUTO-ENTMCNC: 30.6 G/DL (ref 31.5–35.7)
MCV RBC AUTO: 95.5 FL (ref 79–97)
METHGB BLD QL: 0.4 % (ref 0–1.5)
MODALITY: ABNORMAL
NT-PROBNP SERPL-MCNC: 245 PG/ML (ref 0–900)
OXYHGB MFR BLDV: 98.1 % (ref 94–99)
PAW @ PEAK INSP FLOW SETTING VENT: 0 CMH2O
PCO2 BLDA: 62 MM HG (ref 35–45)
PCO2 TEMP ADJ BLD: 62 MM HG (ref 35–48)
PH BLDA: 7.35 PH UNITS (ref 7.35–7.45)
PH, TEMP CORRECTED: 7.35 PH UNITS
PLATELET # BLD AUTO: 108 10*3/MM3 (ref 140–450)
PMV BLD AUTO: 10.1 FL (ref 6–12)
PO2 BLDA: 148 MM HG (ref 83–108)
PO2 TEMP ADJ BLD: 148 MM HG (ref 83–108)
POTASSIUM SERPL-SCNC: 4.2 MMOL/L (ref 3.5–5.2)
PROCALCITONIN SERPL-MCNC: 0.24 NG/ML (ref 0–0.25)
RBC # BLD AUTO: 3.77 10*6/MM3 (ref 4.14–5.8)
SODIUM SERPL-SCNC: 146 MMOL/L (ref 136–145)
SODIUM SERPL-SCNC: 147 MMOL/L (ref 136–145)
SODIUM SERPL-SCNC: 151 MMOL/L (ref 136–145)
TOTAL RATE: 0 BREATHS/MINUTE
TRIGL SERPL-MCNC: 156 MG/DL (ref 0–150)
TSH SERPL DL<=0.05 MIU/L-ACNC: 0.93 UIU/ML (ref 0.27–4.2)
VENTILATOR MODE: ABNORMAL
VLDLC SERPL-MCNC: 26 MG/DL (ref 5–40)
WBC NRBC COR # BLD AUTO: 3.7 10*3/MM3 (ref 3.4–10.8)

## 2025-03-30 PROCEDURE — 94640 AIRWAY INHALATION TREATMENT: CPT

## 2025-03-30 PROCEDURE — 99232 SBSQ HOSP IP/OBS MODERATE 35: CPT | Performed by: HOSPITALIST

## 2025-03-30 PROCEDURE — 94761 N-INVAS EAR/PLS OXIMETRY MLT: CPT

## 2025-03-30 PROCEDURE — 80048 BASIC METABOLIC PNL TOTAL CA: CPT | Performed by: INTERNAL MEDICINE

## 2025-03-30 PROCEDURE — 70551 MRI BRAIN STEM W/O DYE: CPT

## 2025-03-30 PROCEDURE — 84295 ASSAY OF SERUM SODIUM: CPT | Performed by: INTERNAL MEDICINE

## 2025-03-30 PROCEDURE — 36600 WITHDRAWAL OF ARTERIAL BLOOD: CPT

## 2025-03-30 PROCEDURE — 83050 HGB METHEMOGLOBIN QUAN: CPT

## 2025-03-30 PROCEDURE — 82805 BLOOD GASES W/O2 SATURATION: CPT

## 2025-03-30 PROCEDURE — 80061 LIPID PANEL: CPT | Performed by: NURSE PRACTITIONER

## 2025-03-30 PROCEDURE — 84443 ASSAY THYROID STIM HORMONE: CPT | Performed by: INTERNAL MEDICINE

## 2025-03-30 PROCEDURE — 71045 X-RAY EXAM CHEST 1 VIEW: CPT

## 2025-03-30 PROCEDURE — 82948 REAGENT STRIP/BLOOD GLUCOSE: CPT

## 2025-03-30 PROCEDURE — 82375 ASSAY CARBOXYHB QUANT: CPT

## 2025-03-30 PROCEDURE — 25810000003 LACTATED RINGERS PER 1000 ML: Performed by: HOSPITALIST

## 2025-03-30 PROCEDURE — 25010000002 CEFTRIAXONE PER 250 MG: Performed by: INTERNAL MEDICINE

## 2025-03-30 PROCEDURE — 83036 HEMOGLOBIN GLYCOSYLATED A1C: CPT | Performed by: NURSE PRACTITIONER

## 2025-03-30 PROCEDURE — 92610 EVALUATE SWALLOWING FUNCTION: CPT

## 2025-03-30 PROCEDURE — 83880 ASSAY OF NATRIURETIC PEPTIDE: CPT | Performed by: INTERNAL MEDICINE

## 2025-03-30 PROCEDURE — 94799 UNLISTED PULMONARY SVC/PX: CPT

## 2025-03-30 PROCEDURE — 85027 COMPLETE CBC AUTOMATED: CPT | Performed by: INTERNAL MEDICINE

## 2025-03-30 PROCEDURE — 84145 PROCALCITONIN (PCT): CPT | Performed by: INTERNAL MEDICINE

## 2025-03-30 PROCEDURE — 25010000002 HEPARIN (PORCINE) PER 1000 UNITS: Performed by: INTERNAL MEDICINE

## 2025-03-30 PROCEDURE — 94660 CPAP INITIATION&MGMT: CPT

## 2025-03-30 PROCEDURE — 94664 DEMO&/EVAL PT USE INHALER: CPT

## 2025-03-30 PROCEDURE — 82140 ASSAY OF AMMONIA: CPT | Performed by: INTERNAL MEDICINE

## 2025-03-30 PROCEDURE — 92523 SPEECH SOUND LANG COMPREHEN: CPT

## 2025-03-30 RX ORDER — IPRATROPIUM BROMIDE AND ALBUTEROL SULFATE 2.5; .5 MG/3ML; MG/3ML
3 SOLUTION RESPIRATORY (INHALATION) EVERY 4 HOURS PRN
Status: DISCONTINUED | OUTPATIENT
Start: 2025-03-30 | End: 2025-04-01 | Stop reason: HOSPADM

## 2025-03-30 RX ORDER — INSULIN LISPRO 100 [IU]/ML
2-7 INJECTION, SOLUTION INTRAVENOUS; SUBCUTANEOUS
Status: DISCONTINUED | OUTPATIENT
Start: 2025-03-30 | End: 2025-04-01 | Stop reason: HOSPADM

## 2025-03-30 RX ORDER — DEXTROSE MONOHYDRATE 25 G/50ML
25 INJECTION, SOLUTION INTRAVENOUS
Status: DISCONTINUED | OUTPATIENT
Start: 2025-03-30 | End: 2025-04-01 | Stop reason: HOSPADM

## 2025-03-30 RX ORDER — SODIUM CHLORIDE 9 MG/ML
75 INJECTION, SOLUTION INTRAVENOUS CONTINUOUS
Status: DISCONTINUED | OUTPATIENT
Start: 2025-03-30 | End: 2025-03-30

## 2025-03-30 RX ORDER — GUAIFENESIN 600 MG/1
600 TABLET, EXTENDED RELEASE ORAL EVERY 12 HOURS SCHEDULED
Status: DISCONTINUED | OUTPATIENT
Start: 2025-03-30 | End: 2025-04-01 | Stop reason: HOSPADM

## 2025-03-30 RX ORDER — SODIUM CHLORIDE, SODIUM LACTATE, POTASSIUM CHLORIDE, CALCIUM CHLORIDE 600; 310; 30; 20 MG/100ML; MG/100ML; MG/100ML; MG/100ML
75 INJECTION, SOLUTION INTRAVENOUS CONTINUOUS
Status: ACTIVE | OUTPATIENT
Start: 2025-03-30 | End: 2025-03-31

## 2025-03-30 RX ORDER — SODIUM CHLORIDE FOR INHALATION 7 %
4 VIAL, NEBULIZER (ML) INHALATION
Status: DISCONTINUED | OUTPATIENT
Start: 2025-03-30 | End: 2025-03-30

## 2025-03-30 RX ORDER — IPRATROPIUM BROMIDE AND ALBUTEROL SULFATE 2.5; .5 MG/3ML; MG/3ML
3 SOLUTION RESPIRATORY (INHALATION)
Status: DISCONTINUED | OUTPATIENT
Start: 2025-03-30 | End: 2025-04-01 | Stop reason: HOSPADM

## 2025-03-30 RX ORDER — IBUPROFEN 600 MG/1
1 TABLET ORAL
Status: DISCONTINUED | OUTPATIENT
Start: 2025-03-30 | End: 2025-04-01 | Stop reason: HOSPADM

## 2025-03-30 RX ORDER — NICOTINE POLACRILEX 4 MG
15 LOZENGE BUCCAL
Status: DISCONTINUED | OUTPATIENT
Start: 2025-03-30 | End: 2025-04-01 | Stop reason: HOSPADM

## 2025-03-30 RX ADMIN — GUAIFENESIN 600 MG: 600 TABLET, EXTENDED RELEASE ORAL at 21:21

## 2025-03-30 RX ADMIN — IPRATROPIUM BROMIDE AND ALBUTEROL SULFATE 3 ML: 2.5; .5 SOLUTION RESPIRATORY (INHALATION) at 10:05

## 2025-03-30 RX ADMIN — Medication 10 ML: at 21:21

## 2025-03-30 RX ADMIN — SODIUM CHLORIDE, SODIUM LACTATE, POTASSIUM CHLORIDE, CALCIUM CHLORIDE 75 ML/HR: 20; 30; 600; 310 INJECTION, SOLUTION INTRAVENOUS at 10:45

## 2025-03-30 RX ADMIN — IPRATROPIUM BROMIDE AND ALBUTEROL SULFATE 3 ML: 2.5; .5 SOLUTION RESPIRATORY (INHALATION) at 18:56

## 2025-03-30 RX ADMIN — Medication 10 ML: at 08:42

## 2025-03-30 RX ADMIN — ACETAMINOPHEN 650 MG: 325 TABLET, FILM COATED ORAL at 23:00

## 2025-03-30 RX ADMIN — CEFTRIAXONE 2000 MG: 2 INJECTION, POWDER, FOR SOLUTION INTRAMUSCULAR; INTRAVENOUS at 21:20

## 2025-03-30 RX ADMIN — ATORVASTATIN CALCIUM 80 MG: 40 TABLET, FILM COATED ORAL at 21:20

## 2025-03-30 RX ADMIN — GUAIFENESIN 600 MG: 600 TABLET, EXTENDED RELEASE ORAL at 10:43

## 2025-03-30 RX ADMIN — DOXYCYCLINE 100 MG: 100 INJECTION, POWDER, LYOPHILIZED, FOR SOLUTION INTRAVENOUS at 08:41

## 2025-03-30 RX ADMIN — IPRATROPIUM BROMIDE AND ALBUTEROL SULFATE 3 ML: 2.5; .5 SOLUTION RESPIRATORY (INHALATION) at 14:30

## 2025-03-30 RX ADMIN — HEPARIN SODIUM 5000 UNITS: 5000 INJECTION INTRAVENOUS; SUBCUTANEOUS at 21:21

## 2025-03-30 RX ADMIN — HEPARIN SODIUM 5000 UNITS: 5000 INJECTION INTRAVENOUS; SUBCUTANEOUS at 05:11

## 2025-03-30 RX ADMIN — DOXYCYCLINE 100 MG: 100 INJECTION, POWDER, LYOPHILIZED, FOR SOLUTION INTRAVENOUS at 21:20

## 2025-03-30 RX ADMIN — HEPARIN SODIUM 5000 UNITS: 5000 INJECTION INTRAVENOUS; SUBCUTANEOUS at 15:56

## 2025-03-30 NOTE — PROGRESS NOTES
[facility] Medicine Services  PROGRESS NOTE    Patient Name: Anoop Morrison  : 1950  MRN: [id number]    Date of Admission: 3/29/2025  Primary Care Physician: Anjelica Sandoval MD    Subjective   Subjective     CC: AMS    HPI: Remains altered. No complaints. Denies pain.     Objective   Objective     Vital Signs:   Temp:  [97.8 °F (36.6 °C)-98.1 °F (36.7 °C)] 97.8 °F (36.6 °C)  Heart Rate:  [81-91] 81  Resp:  [16] 16  BP: (109-132)/(63-72) 109/65  Flow (L/min) (Oxygen Therapy):  [2] 2     Physical Exam:  NAD, alert on NIPPV  OP clear, dry MM  Neck supple  RRR  Decreased at bases  +BS, soft  L UE contractures  Slight dysarthria  Flat affect    Results Reviewed:  LAB RESULTS:      Lab 25   WBC  --  6.58   HEMOGLOBIN  --  12.4*   HEMATOCRIT  --  40.1   PLATELETS  --  157   NEUTROS ABS  --  5.94   IMMATURE GRANS (ABS)  --  0.02   LYMPHS ABS  --  0.31*   MONOS ABS  --  0.29   EOS ABS  --  0.00   MCV  --  93.9   LACTATE 1.3 2.2*   PROTIME  --  14.7*   APTT  --  30.2         Lab 25  0142 25  1853   SODIUM 147* 150*   POTASSIUM  --  4.7   CHLORIDE  --  106   CO2  --  31.0*   ANION GAP  --  13.0   BUN  --  36*   CREATININE  --  0.95   EGFR  --  85.0   GLUCOSE  --  139*   CALCIUM  --  8.8         Lab 25  1853   ALT (SGPT) 10   AST (SGOT) 13         Lab 25  1853   PROTIME 14.7*   INR 1.14*                 Lab 25  0416 25  2300   PH, ARTERIAL 7.352 7.353   PCO2, ARTERIAL 62.0* 63.4*   PO2 .0* 85.2   FIO2 38 28   HCO3 ART 34.3* 35.2*   BASE EXCESS ART 7.0* 7.8*   CARBOXYHEMOGLOBIN 1.3 1.4     Brief Urine Lab Results       None            Microbiology Results Abnormal       None            XR Chest 1 View  Result Date: 3/30/2025  XR CHEST 1 VW Date of Exam: 3/30/2025 2:31 AM EDT Indication: possible pneumonia, known pulmonary fibrosis Comparison: 3/29/2025 Findings: Cardiomegaly is unchanged. There is continued scarring in the  upper lobes. There is improved aeration at the left lung base. No pneumothorax is seen.     Impression: 1.Improved aeration at the left lung base. 2.Stable scarring in the upper lobes. Electronically Signed: Kota Nunez MD  3/30/2025 5:31 AM EDT  Workstation ID: [id number]    XR Chest 1 View  Result Date: 3/29/2025  XR CHEST 1 VW Date of Exam: 3/29/2025 7:58 PM EDT Indication: Acute Stroke Protocol (onset < 12 hrs) Comparison: None available. Findings: Heart is enlarged. There is atherosclerotic disease in the aorta. Pulmonary vascularity is normal. There is some mild scarring/atelectasis/infiltrate in the lower left lung. There is also probably some scarring in the left upper lobe and right upper lobe. No pneumothorax. Consider upright PA and lateral chest x-ray when clinically feasible.     Impression: 1.Cardiomegaly. 2.Areas of scarring/atelectasis/infiltrate in the lower left lung. 3.Probable scarring in the upper lobes. Electronically Signed: Kota Nunez MD  3/29/2025 8:30 PM EDT  Workstation ID: [id number]    CT Angiogram Head w AI Analysis of LVO  Result Date: 3/29/2025  CT ANGIOGRAM HEAD W AI ANALYSIS OF LVO, CT ANGIOGRAM NECK Date of Exam: 3/29/2025 6:46 PM EDT Indication: Neuro Deficit, acute, Stroke suspected Neuro deficit, acute stroke suspected. Comparison: None available. Technique: CTA of the head and neck was performed after the uneventful intravenous administration of 125 cc Isovue-370. Reconstructed coronal and sagittal images were also obtained. In addition, a 3-D volume rendered image was created for interpretation.  Automated exposure control and iterative reconstruction methods were used. Findings: CTA NECK: *Aortic arch: No aneurysm. No significant stenosis or occlusion of the major arch vessels. *Left carotid system: No aneurysm, significant stenosis or occlusion. *Right carotid system: No aneurysm, significant stenosis or occlusion. *Vertebrobasilar system: The vertebral arteries arise  from their respective subclavian arteries. No aneurysm, significant stenosis or occlusion. *Mosaic attenuation of the lung apices. Correlate for pulmonary infectious or inflammatory process. CTA HEAD: *Anterior circulation: No aneurysm, significant stenosis or occlusion. 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 *Posterior circulation: No aneurysm, significant stenosis or occlusion. *Anatomic variants: None of significance. *Venous sinuses: Patent.     Impression: 1.No hemodynamically significant stenosis, large vessel cut off or aneurysms in the intracranial circulation 2.No hemodynamically significant stenosis, dissection or aneurysms in the extracranial circulation. 3.Mosaic attenuation of the upper lungs which may reflect underlying infectious or inflammatory process. Electronically Signed: Diego Martinez MD  3/29/2025 7:38 PM EDT  Workstation ID: [id number]    CT Angiogram Neck  Result Date: 3/29/2025  CT ANGIOGRAM HEAD W AI ANALYSIS OF LVO, CT ANGIOGRAM NECK Date of Exam: 3/29/2025 6:46 PM EDT Indication: Neuro Deficit, acute, Stroke suspected Neuro deficit, acute stroke suspected. Comparison: None available. Technique: CTA of the head and neck was performed after the uneventful intravenous administration of 125 cc Isovue-370. Reconstructed coronal and sagittal images were also obtained. In addition, a 3-D volume rendered image was created for interpretation.  Automated exposure control and iterative reconstruction methods were used. Findings: CTA NECK: *Aortic arch: No aneurysm. No significant stenosis or occlusion of the major arch vessels. *Left carotid system: No aneurysm, significant stenosis or occlusion. *Right carotid system: No aneurysm, significant stenosis or occlusion. *Vertebrobasilar system: The vertebral arteries arise from their respective subclavian arteries. No aneurysm,  significant stenosis or occlusion. *Mosaic attenuation of the lung apices. Correlate for pulmonary infectious or inflammatory process. CTA HEAD: *Anterior circulation: No aneurysm, significant stenosis or occlusion. 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 *Posterior circulation: No aneurysm, significant stenosis or occlusion. *Anatomic variants: None of significance. *Venous sinuses: Patent.     Impression: 1.No hemodynamically significant stenosis, large vessel cut off or aneurysms in the intracranial circulation 2.No hemodynamically significant stenosis, dissection or aneurysms in the extracranial circulation. 3.Mosaic attenuation of the upper lungs which may reflect underlying infectious or inflammatory process. Electronically Signed: Diego Martinez MD  3/29/2025 7:38 PM EDT  Workstation ID: [id number]    CT CEREBRAL PERFUSION WITH & WITHOUT CONTRAST  Result Date: 3/29/2025  CT CEREBRAL PERFUSION W WO CONTRAST Date of Exam: 3/29/2025 6:46 PM EDT Indication: Neuro Deficit, acute, Stroke suspected Neuro deficit, acute stroke suspected.  Comparison: None available. Technique: Axial CT images of the brain were obtained prior to and after the administration of 125 cc Isovue-370. Core blood volume, core blood flow, mean transit time, and Tmax images were obtained utilizing the Rapid software protocol. A limited CT angiogram of the head was also performed to measure the blood vessel density. The radiation dose reduction device was turned on for each scan per the ALARA (As Low as Reasonably Achievable) protocol. Findings:  Cerebral blood flow, blood volume, and mean transit time maps are symmetric without large perfusion defect.  CBF<30% volume: 0mL Tmax>6sec volume: 0 mL Mismatch volume: 0mL Mismatch ratio: None       Impression:  1. No evidence of core infarct nor ischemic brain at risk. Electronically  Signed: Diego Martinez MD  3/29/2025 7:35 PM EDT  Workstation ID: [id number]    CT Abdomen Pelvis With Contrast  Result Date: 3/29/2025  CT ABDOMEN PELVIS W CONTRAST Date of Exam: 3/29/2025 6:51 PM EDT Indication: Vomiting diarrhea, abdominal pain. Comparison: None available. Technique: Axial CT images were obtained of the abdomen and pelvis following the uneventful intravenous administration of 125 cc Isovue-370. Reconstructed coronal and sagittal images were also obtained. Automated exposure control and iterative construction methods were used. Findings: LUNG BASES: Minimal basal atelectasis/parenchymal scarring. LIVER: Few scattered subcentimeter hypodensities likely representing cysts although too small to adequately characterize. BILIARY/GALLBLADDER:  Unremarkable SPLEEN:  Unremarkable PANCREAS:  Unremarkable ADRENAL:  Unremarkable KIDNEYS:  Unremarkable parenchyma with no solid mass identified. No obstruction.  No calculus identified. GASTROINTESTINAL/MESENTERY:  No evidence of obstruction nor inflammation.  The appendix is normal. MESENTERIC VESSELS:  Patent. AORTA/IVC:  Normal caliber. RETROPERITONEUM/LYMPH NODES:  Unremarkable REPRODUCTIVE:  Unremarkable BLADDER: There is mild nonspecific urinary bladder mural thickening. Correlate for signs of cystitis. OSSEUS STRUCTURES: No acute osseous process identified. Chronic posttraumatic changes involving the right femoral neck related to previous fracture. Potential old left femoral neck fracture as well. There is generalized osteopenia and degenerative change  throughout the lumbar spine. Absence versus resection of the distal coccyx. Correlate with history.     Impression: Impression: 1.Mild nonspecific urinary bladder mural thickening. Correlate for signs of cystitis. 2.No acute inflammatory process identified in the abdomen or pelvis. 3.Chronic changes as above. Electronically Signed: Diego Martinez MD  3/29/2025 7:26 PM EDT  Workstation ID: [id number]    CT Head  Without Contrast Stroke Protocol  Result Date: 3/29/2025  CT HEAD WO CONTRAST STROKE PROTOCOL Date of Exam: 3/29/2025 6:38 PM EDT Indication: Neuro deficit, acute, stroke suspected Neuro Deficit, acute, Stroke suspected. Comparison: None available. Technique: Axial CT images were obtained of the head without contrast administration.  Reconstructed coronal images were also obtained. Automated exposure control and iterative construction methods were used. Scan Time: 6:42 p.m. Results discussed with stroke navigator at 6:48 p.m. Findings: There is no evidence of acute territorial infarction. There is no acute intracranial hemorrhage. There are no extra-axial collections. Ventricles and CSF spaces are symmetric. No mass effect nor hydrocephalus. Brain parenchyma appears normal for age.  There is scattered mucosal thickening within the inferior recesses of the maxillary sinuses.  Osseous structures and orbits appear intact.     Impression: Impression: No acute intracranial findings. Electronically Signed: Diego Martinez MD  3/29/2025 6:48 PM EDT  Workstation ID: [id number]          Current medications:  Scheduled Meds:aspirin, 81 mg, Oral, Daily   Or  aspirin, 300 mg, Rectal, Daily  atorvastatin, 80 mg, Oral, Nightly  cefTRIAXone, 2,000 mg, Intravenous, Q24H  doxycycline, 100 mg, Intravenous, Q12H  heparin (porcine), 5,000 Units, Subcutaneous, Q8H  Pharmacy Meds to Bed Consult, , Not Applicable, Daily  sodium chloride, 10 mL, Intravenous, Q12H      Continuous Infusions:pharmacy consult - MTM,   sodium chloride, 75 mL/hr, Last Rate: 75 mL/hr (03/29/25 4508)  sodium chloride, 75 mL/hr      PRN Meds:.  acetaminophen **OR** acetaminophen **OR** acetaminophen    senna-docusate sodium **AND** polyethylene glycol **AND** bisacodyl **AND** bisacodyl    ipratropium-albuterol    pharmacy consult - MTM    sodium chloride    sodium chloride    sodium chloride    Assessment & Plan   Assessment & Plan     Active Hospital Problems     Diagnosis  POA    **AMS (altered mental status) [R41.82]  Yes      Resolved Hospital Problems   No resolved problems to display.        Brief Hospital Course to date:  Anoop Morrison is a 74 y.o. male with history of HTN, OA, GERD, DM, possible DM/IPF, and CKD    Hypernatremia  -s/p IVF, repeat labs pending    N/V/Diarrhea  -recent norovirus at facility, GI PCR pending, contact precautions    Recent COVID19  ILD  -possible infiltrate/aspiration   -on rocephin/doxy  -pulmonary toilet    AMS  -followed by stroke team  -MRI pending    TCP  -monitor, on RAJIV    DM  -SSI    Schizophrenia  Anxiety/Depression  -resume appropriate home meds    CKD  -monitor renal function    Expected Discharge Location and Transportation: TBD  Expected Discharge TBD  Expected discharge date/ time has not been documented.     VTE Prophylaxis:  Pharmacologic & mechanical VTE prophylaxis orders are present.         AM-PAC 6 Clicks Score (PT): 8 (03/30/25 0122)    CODE STATUS:   Code Status and Medical Interventions: CPR (Attempt to Resuscitate); Full Support   Ordered at: 03/29/25 2200     Code Status (Patient has no pulse and is not breathing):    CPR (Attempt to Resuscitate)     Medical Interventions (Patient has pulse or is breathing):    Full Support     Level Of Support Discussed With:    Next of Kin (If No Surrogate)       Health Care Surrogate       Jason Dodd MD  03/30/25

## 2025-03-30 NOTE — PLAN OF CARE
Problem: Adult Inpatient Plan of Care  Goal: Plan of Care Review  Outcome: Progressing  Flowsheets (Taken 3/30/2025 1029)  Plan of Care Reviewed With: patient   Goal Outcome Evaluation:  Plan of Care Reviewed With: patient      SLP evaluation completed. Will address mild cognitive impairment and follow for diet tolerance. Please see note for further details and recommendations.            Anticipated Discharge Disposition (SLP): skilled nursing facility    SLP Diagnosis: moderate-severe, dysarthria, mild, cognitive-linguistic disorder (03/30/25 0920)  SLP Diagnosis Comments: Pt reports dysarthria is baseline due self-reported cerebral palsy. Pt presents with impairmed pragmatics and overall slow cognitive processing, though was able to all items accurately. Suspect pragmatic impairment is baseline, but unclear if slow processing is baseline or new. Will follow. (03/30/25 0920)  SLP Swallowing Diagnosis: R/O pharyngeal dysphagia (03/30/25 0920)

## 2025-03-30 NOTE — ED NOTES
Anoop Morrison    Nursing Report ED to Floor:  Mental status: Aox4 Currently lethargic   Ambulatory status: Nonambulatory   Oxygen Therapy:  RA  Cardiac Rhythm: NSR  Admitted from: [place]  Safety Concerns:  Fall risk  Precautions: Contact and droplet  Social Issues: None  ED Room #:  17    ED Nurse Phone Extension - [phone] or may call [phone].      HPI:   Chief Complaint   Patient presents with    Altered Mental Status       Past Medical History:  Past Medical History:   Diagnosis Date    Acute sinusitis         Past Surgical History:  No past surgical history on file.     Admitting Doctor:   Lorne Merlos MD    Consulting Provider(s):  Consults       Date and Time Order Name Status Description    3/29/2025  6:39 PM Inpatient Neurology Consult Stroke Completed              Admitting Diagnosis:   The primary encounter diagnosis was Pneumonia of both upper lobes due to infectious organism. Diagnoses of Gastroenteritis, Volume depletion, Lethargy, Hypernatremia, and Acute confusion were also pertinent to this visit.    Most Recent Vitals:   Vitals:    03/29/25 1855 03/29/25 1859 03/29/25 1930 03/29/25 2101   BP: 127/72  122/67 109/65   BP Location: Right arm      Patient Position: Lying      Pulse:  91 88 86   Resp:       Temp:   98.1 °F (36.7 °C)    TempSrc:   Oral    SpO2: 90%  98% 95%   Weight:       Height:           Active LDAs/IV Access:   Lines, Drains & Airways       Active LDAs       Name Placement date Placement time Site Days    Peripheral IV 03/29/25 Distal;Left;Posterior Forearm 03/29/25  --  Forearm  less than 1    Peripheral IV 03/29/25 1853 Right Antecubital 03/29/25 1853  Antecubital  less than 1                    Labs (abnormal labs have a star):   Labs Reviewed   PROTIME-INR - Abnormal; Notable for the following components:       Result Value    Protime 14.7 (*)     INR 1.14 (*)     All other components within normal limits   CBC WITH AUTO DIFFERENTIAL - Abnormal; Notable for the following  components:    Hemoglobin 12.4 (*)     MCHC 30.9 (*)     Neutrophil % 90.3 (*)     Lymphocyte % 4.7 (*)     Monocyte % 4.4 (*)     Eosinophil % 0.0 (*)     Lymphocytes, Absolute 0.31 (*)     All other components within normal limits   LACTIC ACID, PLASMA - Abnormal; Notable for the following components:    Lactate 2.2 (*)     All other components within normal limits   BASIC METABOLIC PANEL - Abnormal; Notable for the following components:    Glucose 139 (*)     BUN 36 (*)     Sodium 150 (*)     CO2 31.0 (*)     BUN/Creatinine Ratio 37.9 (*)     All other components within normal limits    Narrative:     GFR Categories in Chronic Kidney Disease (CKD)      GFR Category          GFR (mL/min/1.73)    Interpretation  G1                     90 or greater         Normal or high (1)  G2                      60-89                Mild decrease (1)  G3a                   45-59                Mild to moderate decrease  G3b                   30-44                Moderate to severe decrease  G4                    15-29                Severe decrease  G5                    14 or less           Kidney failure          (1)In the absence of evidence of kidney disease, neither GFR category G1 or G2 fulfill the criteria for CKD.    eGFR calculation 2021 CKD-EPI creatinine equation, which does not include race as a factor   APTT - Normal    Narrative:     PTT = The equivalent PTT values for the therapeutic range of heparin levels at 0.3 to 0.5 U/ml are 60 to 70 seconds.   AST - Normal   ALT - Normal   COVID-19 RAPID AG,VERITOR,COR/PAD/BROOKS/ILDEFONSO/ADELFO/LAG IN-HOUSE,DRY SWAB 1 HR TAT   RAINBOW DRAW    Narrative:     The following orders were created for panel order [place] Draw.  Procedure                               Abnormality         Status                     ---------                               -----------         ------                     Green Top (Gel)[[id number]]                                  Final result                Lavender Top[[id number]]                                     Final result               Gold Top - [facility][[id number]]                                   Final result               Wolff Top[[id number]]                                         Final result               Light Blue Top[[id number]]                                   Final result                 Please view results for these tests on the individual orders.   LACTIC ACID, REFLEX   BASIC METABOLIC PANEL   SODIUM   SODIUM   CBC (NO DIFF)   POCT CHEM 8   POCT GLUCOSE FINGERSTICK   POCT GLUCOSE FINGERSTICK   POCT GLUCOSE FINGERSTICK   POCT GLUCOSE FINGERSTICK   CBC AND DIFFERENTIAL    Narrative:     The following orders were created for panel order CBC & Differential.  Procedure                               Abnormality         Status                     ---------                               -----------         ------                     CBC Auto Differential[[id number]]        Abnormal            Final result                 Please view results for these tests on the individual orders.   GREEN TOP   LAVENDER TOP   GOLD [facility]   GRAY TOP   LIGHT BLUE TOP       Meds Given in ED:   Medications   sodium chloride 0.9 % flush 10 mL (has no administration in time range)   atorvastatin (LIPITOR) tablet 80 mg (has no administration in time range)   aspirin chewable tablet 81 mg ( Oral Not Given:  See Alt 3/29/25 2023)     Or   aspirin suppository 300 mg (300 mg Rectal Given 3/29/25 2023)   doxycycline (VIBRAMYCIN) 100 mg in sodium chloride 0.9 % 100 mL MBP (has no administration in time range)   sodium chloride 0.9 % flush 10 mL (has no administration in time range)   sodium chloride 0.9 % flush 10 mL (has no administration in time range)   sodium chloride 0.9 % infusion 40 mL (has no administration in time range)   heparin (porcine) 5000 UNIT/ML injection 5,000 Units (has no administration in time range)   sennosides-docusate (PERICOLACE) 8.6-50 MG per tablet 2 tablet  (has no administration in time range)     And   polyethylene glycol (MIRALAX) packet 17 g (has no administration in time range)     And   bisacodyl (DULCOLAX) EC tablet 5 mg (has no administration in time range)     And   bisacodyl (DULCOLAX) suppository 10 mg (has no administration in time range)   acetaminophen (TYLENOL) tablet 650 mg (has no administration in time range)     Or   acetaminophen (TYLENOL) 160 MG/5ML oral solution 650 mg (has no administration in time range)     Or   acetaminophen (TYLENOL) suppository 650 mg (has no administration in time range)   iopamidol (ISOVUE-370) 76 % injection 125 mL (125 mL Intravenous Given 3/29/25 1919)   sodium chloride 0.9 % bolus 1,000 mL (0 mL Intravenous Stopped 3/29/25 2105)   cefTRIAXone (ROCEPHIN) 2,000 mg in sodium chloride 0.9 % 100 mL MBP (0 mg Intravenous Stopped 3/29/25 2151)   sodium chloride 0.9 % bolus 1,000 mL (0 mL Intravenous Stopped 3/29/25 2151)           Last NIH score:  Interval: baseline  1a. Level of Consciousness: 0-->Alert, keenly responsive  1b. LOC Questions: 0-->Answers both questions correctly  1c. LOC Commands: 0-->Performs both tasks correctly  2. Best Gaze: 0-->Normal  3. Visual: 0-->No visual loss  4. Facial Palsy: 1-->Minor paralysis (flattened nasolabial fold, asymmetry on smiling)  5a. Motor Arm, Left: 0-->No drift, limb holds 90 (or 45) degrees for full 10 secs  5b. Motor Arm, Right: 0-->No drift, limb holds 90 (or 45) degrees for full 10 secs  6a. Motor Leg, Left: 0-->No drift, leg holds 30 degree position for full 5 secs  6b. Motor Leg, Right: 0-->No drift, leg holds 30 degree position for full 5 secs  7. Limb Ataxia: 0-->Absent  8. Sensory: 0-->Normal, no sensory loss  9. Best Language: 1-->Mild-to-moderate aphasia, some obvious loss of fluency or facility of comprehension, without significant limitation on ideas expressed or form of expression. Reduction of speech and/or comprehension, however, makes conversation. . . (see row  details)  10. Dysarthria: 0-->Normal  11. Extinction and Inattention (formerly Neglect): 0-->No abnormality    Total (NIH Stroke Scale): 2     Dysphagia screening results:  Patient Factors Component (Dysphagia:Stroke or Rule-out)  Best Eye Response: 4-->(E4) spontaneous (03/29/25 1931)  Best Motor Response: 6-->(M6) obeys commands (03/29/25 1931)  Best Verbal Response: 5-->(V5) oriented (03/29/25 1931)  Tye Coma Scale Score: 15 (03/29/25 1931)  Is there Facial Asymmetry/Weakness?: Yes (03/29/25 1931)  Is there Tongue Asymmetry/Weakness?: No (03/29/25 1931)  Is there Palatal Asymmetry/Weakness?: No (03/29/25 1931)  Patient Assessment Result: (!) Fail (03/29/25 1931)     Tye Coma Scale:  No data recorded     CIWA:        Restraint Type:            Isolation Status:  No active isolations

## 2025-03-30 NOTE — CONSULTS
Stroke Consult Note    Patient Name: Anoop Morrison   MRN: [id number]  Age: 74 y.o.  Sex: male  : 1950    Primary Care Physician: Anjelica Sandoval MD  Referring Physician: Dr. Mcconnell    TIME STROKE TEAM CALLED:  EST     TIME PATIENT SEEN:  EST    Handedness: Unknown  Race: Occasion    Chief Complaint/Reason for Consultation: Dysarthria, left facial droop    HPI: Anoop Morrison is a 74-year-old,  male with known diagnosis of prior CVA, HTN, T2DM, pulmonary fibrosis, depression,, paranoid schizophrenia, obesity, CKD, and GERD who is a resident of [facility] who presents today with dysarthria and left-sided facial droop.  EMS was called this afternoon as staff felt the patient was more altered with worsening speech from his baseline.  of note patient's initial BP per EMS was 81/53.  Patient was diagnosed with COVID approximately 1 week ago, staff also reported that there is currently an outbreak of norovirus on his floor.    On exam patient is able to tell me his name and age.  He is very dysarthric with a slight facial droop on the left.  His gaze is normal, reacts to visual threat in all 4 quadrants.  He does have word finding difficulty.  He does indicate that he has some baseline left arm weakness as it does appear his hand is contracted.  He has no antigravity strength of either lower extremity I do not believe he can walk at baseline though this is not clear as no family is available.  While in the emergency department his blood pressure did improved to the 120 systolic at which point his left facial droop had resolved.      Last Known Normal Date/Time: 09:00 EST     Review of Systems   Reason unable to perform ROS: Patient has limited speech.   Neurological:  Positive for speech difficulty, weakness and headaches.      Past Medical History:   Diagnosis Date    Acute sinusitis      No past surgical history on file.  Family History   Problem Relation Age of Onset    Arthritis Other      Cancer Other     Heart attack Other     Diabetes Other     Hypertension Other     Stroke Other      Social History     Socioeconomic History    Marital status: Single   Tobacco Use    Smoking status: Never    Smokeless tobacco: Never   Substance and Sexual Activity    Alcohol use: No     Allergies   Allergen Reactions    Other      Prior to Admission medications    Medication Sig Start Date End Date Taking? Authorizing Provider   amLODIPine (NORVASC) 5 MG tablet Take 1 tablet by mouth Daily. 2/20/18   Anjelica Sandoval MD   diazePAM (VALIUM) 2 MG tablet Take 1 tablet by mouth As Needed for anxiety. 2/13/17   Anjelica Sandoval MD   fluticasone (FLONASE) 50 MCG/ACT nasal spray 2 sprays into each nostril Daily. 8/8/17   Flaquita Raphael PA   meloxicam (MOBIC) 7.5 MG tablet Take 1 tablet by mouth Daily. 2/20/18   Anjelica Sandoval MD   omeprazole (priLOSEC) 20 MG capsule Take 1 capsule by mouth Daily. 2/20/18   Anjelica Sandoval MD   thioridazine (MELLARIL) 50 MG tablet  7/26/16   Provider, MD [place]         Temp:  [98.1 °F (36.7 °C)] 98.1 °F (36.7 °C)  Heart Rate:  [88-91] 88  Resp:  [16] 16  BP: (122-127)/(67-72) 122/67  Neurological Exam  Mental Status  Alert. Oriented only to person. Mild dysarthria present. Expressive aphasia present.    Cranial Nerves  CN II: Visual fields full to confrontation.  CN III, IV, VI: Extraocular movements intact bilaterally. Normal lids and orbits bilaterally. Pupils equal round and reactive to light bilaterally.  CN VII:  Right: There is no facial weakness.  Left: There is central facial weakness.  CN XI: Shoulder shrug strength is normal.    Motor  Decreased muscle bulk throughout. No fasciculations present. Normal muscle tone. No abnormal involuntary movements. Strength is 5/5 in all four extremities except as noted.  LUE 3+/5, left wrist/hand contractures  RUE/5  BLE 1/5.    Sensory  Light touch is normal in upper and lower extremities.     Coordination    Dysmetria out of  proportion to weakness.    Gait    Not tested.      Physical Exam  Vitals reviewed.   Constitutional:       Appearance: Normal appearance. He is obese.   HENT:      Head: Normocephalic and atraumatic.   Eyes:      General: Lids are normal.      Extraocular Movements: Extraocular movements intact.      Pupils: Pupils are equal, round, and reactive to light.   Cardiovascular:      Rate and Rhythm: Normal rate.   Pulmonary:      Effort: Pulmonary effort is normal. No respiratory distress.   Musculoskeletal:      Cervical back: Normal range of motion.      Right lower leg: Edema present.      Left lower leg: Edema present.   Skin:     Coloration: Skin is pale.   Neurological:      Mental Status: He is alert. He is disoriented.      Cranial Nerves: Cranial nerve deficit and dysarthria present.      Sensory: No sensory deficit.      Motor: Weakness present.   Psychiatric:         Mood and Affect: Mood normal.         Behavior: Behavior normal.         Acute Stroke Data    Thrombolytic Inclusion / Exclusion Criteria    Time: 20:51 EDT  Person Administering Scale: PIERRE Gutierrez      YES NO INCLUSION CRITERIA CLASS I   [x] []   Suspected diagnosis of acute ischemic stroke with measureable neurological deficit.  Low NIHSS with disabling stroke symptoms.   [] [x]   Onset of stroke symptoms < 3 hours before beginning treatment >/ 18 years old  Stroke symptom onset = time patient was last seen well or without symptoms (LKW)   [] [x]   Onset of symptoms between 3-4.5 hours: >/= 80 years old (safe Class IIa) with history of   both diabetes and prior CVA  (reasonable Class IIb) AND NIHSS </= 25  *If not eligible for IV Thrombolytic consider neuro intervention for LKW within 24 hours     YES NO EXCLUSION CRITERIA (CONTRAINDICATIONS) CLASS III EVIDENCE HARM   [] []   Blood pressure >185/110 medically refractory to IV medications   [] []   Active bleeding at a non-compressible site   [] []   Active intracranial hemorrhage  (ICH)   [] []   Symptoms suggestive of subarachnoid hemorrhage (SAH)   [] []   GI bleed within 21 days   [] []   Ischemic stroke within 3 months   [] []   Severe head trauma within 3 months    [] []   Intracranial or intraspinal surgery within 3 months   [] []   Current GI malignancy   [] []   Intracranial neoplasm   [] []   Infective endocarditis   [] []   Aortic arch dissection   [] []   Active coagulopathy with  INR >1.7, platelets <100,000, PTT > 40 sec, PT > 15 sec   *For warfarin, administration can begin before blood tests resulted. Discontinue for above values.    [] []   Treatment dose* of LMWH (Lovenox) in last 24 hours  *prophylactic dosages are not a contraindication   [] []   Concurrent use of antiplatelet agents' glycoprotein inhibitors IIb/IIIa (Integrilin, etc.)   [] []   Thrombin or factor Xa inhibitors (Eliquis, Xarelto, Arixtra) taken in last 48 hours     YES NO CLASS II: AIS WITH THE FOLLOWING CONDITIONS -   TREATMENT RISKS SHOULD BE WEIGHED AGAINST POSSIBLE BENEFITS.    [] []   Major trauma in last 14 days, recent major surgery in last 14 days, intracranial arterial dissection, giant unruptured and unsecured intracranial aneurysm, pericarditis     [] []   The risks and benefits have been discussed with the patient or family related to the administration of IV thrombolytic therapy for stroke symptoms.   [] []   I have discussed and reviewed the patient's case and imaging with the attending prior to IV thrombolytic therapy.   TIME  Time IV thrombolytic administered       Hospital Meds:  Scheduled- aspirin, 81 mg, Oral, Daily   Or  aspirin, 300 mg, Rectal, Daily  cefTRIAXone, 2,000 mg, Intravenous, Once  doxycycline, 100 mg, Intravenous, Once  sodium chloride, 1,000 mL, Intravenous, Once      Infusions-     PRNs-   sodium chloride    Functional Status Prior to Current Stroke/Ingham Score:     MODIFIED LUCIA SCALE (to be assessed for each patient having history of stroke) []Stroke history but not  assessed  []0: No symptoms at all  []1: No significant disability despite symptoms  []2: Slight disability  []3: Moderate disability  [x]4: Moderately severe disability  []5: Severe disability  []6: Death         NIH Stroke Scale  Time: 20:51 EDT  Person Administering Scale: PIERRE Gutierrez    1a  Level of consciousness: 0=alert; keenly responsive   1b. LOC questions:  1=Answers one question correctly   1c. LOC commands: 0=Performs both tasks correctly   2.  Best Gaze: 0=normal   3.  Visual: 0=No visual loss   4. Facial Palsy: 1=Minor paralysis (flattened nasolabial fold, asymmetric on smiling)   5a.  Motor left arm: 0=No drift, limb holds 90 (or 45) degrees for full 10 seconds   5b.  Motor right arm: 0=No drift, limb holds 90 (or 45) degrees for full 10 seconds   6a. motor left leg: 3=No effort against gravity, limb falls   6b  Motor right leg:  3=No effort against gravity, limb falls   7. Limb Ataxia: 0=Absent   8.  Sensory: 0=Normal; no sensory loss   9. Best Language:  1=Mild to moderate aphasia; some obvious loss of fluency or facility of comprehension without significant limitation on ideas expressed or form of expression.   10. Dysarthria: 1=Mild to moderate, patient slurs at least some words and at worst, can be understood with some difficulty   11. Extinction and Inattention: 0=No abnormality    Total:   10       Results Reviewed:  I have personally reviewed current lab, radiology, and data and agree with results.  WBC   Date Value Ref Range Status   03/29/2025 6.58 3.40 - 10.80 10*3/mm3 Final     RBC   Date Value Ref Range Status   03/29/2025 4.27 4.14 - 5.80 10*6/mm3 Final     Hemoglobin   Date Value Ref Range Status   03/29/2025 12.4 (L) 13.0 - 17.7 g/dL Final     Hematocrit   Date Value Ref Range Status   03/29/2025 40.1 37.5 - 51.0 % Final     MCV   Date Value Ref Range Status   03/29/2025 93.9 79.0 - 97.0 fL Final     MCH   Date Value Ref Range Status   03/29/2025 29.0 26.6 - 33.0 pg Final      MCHC   Date Value Ref Range Status   03/29/2025 30.9 (L) 31.5 - 35.7 g/dL Final     RDW   Date Value Ref Range Status   03/29/2025 14.9 12.3 - 15.4 % Final     RDW-SD   Date Value Ref Range Status   03/29/2025 51.7 37.0 - 54.0 fl Final     MPV   Date Value Ref Range Status   03/29/2025 10.6 6.0 - 12.0 fL Final     Platelets   Date Value Ref Range Status   03/29/2025 157 140 - 450 10*3/mm3 Final     Neutrophil %   Date Value Ref Range Status   03/29/2025 90.3 (H) 42.7 - 76.0 % Final     Lymphocyte %   Date Value Ref Range Status   03/29/2025 4.7 (L) 19.6 - 45.3 % Final     Monocyte %   Date Value Ref Range Status   03/29/2025 4.4 (L) 5.0 - 12.0 % Final     Eosinophil %   Date Value Ref Range Status   03/29/2025 0.0 (L) 0.3 - 6.2 % Final     Basophil %   Date Value Ref Range Status   03/29/2025 0.3 0.0 - 1.5 % Final     Immature Grans %   Date Value Ref Range Status   03/29/2025 0.3 0.0 - 0.5 % Final     Neutrophils, Absolute   Date Value Ref Range Status   03/29/2025 5.94 1.70 - 7.00 10*3/mm3 Final     Lymphocytes, Absolute   Date Value Ref Range Status   03/29/2025 0.31 (L) 0.70 - 3.10 10*3/mm3 Final     Monocytes, Absolute   Date Value Ref Range Status   03/29/2025 0.29 0.10 - 0.90 10*3/mm3 Final     Eosinophils, Absolute   Date Value Ref Range Status   03/29/2025 0.00 0.00 - 0.40 10*3/mm3 Final     Basophils, Absolute   Date Value Ref Range Status   03/29/2025 0.02 0.00 - 0.20 10*3/mm3 Final     Immature Grans, Absolute   Date Value Ref Range Status   03/29/2025 0.02 0.00 - 0.05 10*3/mm3 Final     nRBC   Date Value Ref Range Status   03/29/2025 0.0 0.0 - 0.2 /100 WBC Final     Lab Results   Component Value Date    GLUCOSE 139 (H) 03/29/2025    BUN 36 (H) 03/29/2025    CREATININE 0.95 03/29/2025     (H) 03/29/2025    K 4.7 03/29/2025     03/29/2025    CALCIUM 8.8 03/29/2025    PROTEINTOT 6.8 02/20/2018    ALBUMIN 4.50 02/20/2018    ALT 10 03/29/2025    AST 13 03/29/2025    ALKPHOS 102 (H) 02/20/2018     BILITOT 0.3 02/20/2018    GLOB 2.3 02/20/2018    AGRATIO 2.0 02/20/2018    BCR 37.9 (H) 03/29/2025    ANIONGAP 13.0 03/29/2025    EGFR 85.0 03/29/2025       CT Angiogram Head w AI Analysis of LVO  Result Date: 3/29/2025  1.No hemodynamically significant stenosis, large vessel cut off or aneurysms in the intracranial circulation 2.No hemodynamically significant stenosis, dissection or aneurysms in the extracranial circulation. 3.Mosaic attenuation of the upper lungs which may reflect underlying infectious or inflammatory process. Electronically Signed: Diego Martinez MD  3/29/2025 7:38 PM EDT  Workstation ID: [id number]    CT Angiogram Neck  Result Date: 3/29/2025  1.No hemodynamically significant stenosis, large vessel cut off or aneurysms in the intracranial circulation 2.No hemodynamically significant stenosis, dissection or aneurysms in the extracranial circulation. 3.Mosaic attenuation of the upper lungs which may reflect underlying infectious or inflammatory process. Electronically Signed: Diego Martinez MD  3/29/2025 7:38 PM EDT  Workstation ID: [id number]    CT CEREBRAL PERFUSION WITH & WITHOUT CONTRAST  Result Date: 3/29/2025   1. No evidence of core infarct nor ischemic brain at risk. Electronically Signed: Diego Martinez MD  3/29/2025 7:35 PM EDT  Workstation ID: [id number]    CT Head Without Contrast Stroke Protocol  Result Date: 3/29/2025  Impression: No acute intracranial findings. Electronically Signed: Diego Martinez MD  3/29/2025 6:48 PM EDT  Workstation ID: [id number]      Assessment/Plan:  74-year-old,  male with known diagnosis of prior CVA, HTN, T2DM, pulmonary fibrosis, depression,, paranoid schizophrenia, obesity, CKD, and GERD who is a resident of [facility] who presents today with dysarthria and left-sided facial droop.  EMS was called this afternoon as staff felt the patient was more altered with worsening speech from his baseline.  Of note patient's initial BP per EMS was 81/53.   Patient was diagnosed with COVID approximately 1 week ago, staff also reported that there is currently an outbreak of norovirus on his floor.  His initial NIHSS is 10.  CT head is negative for acute abnormality.  CTA head and neck is negative for significant flow-limiting stenosis no LVO or aneurysm, CT perfusion is negative.      Antiplatelet PTA: None  Anticoagulant PTA: None        Dysarthria        Transient left facial droop -resolved with improved blood pressure        History of stroke  -Suspect patient's symptoms are likely secondary to metabolic/infectious encephalopathy with pneumonia, however I am uncertain of his baseline status and residual deficits from his prior stroke.  Acute stroke versus stroke recrudescence remains in the differential  -TIA/ischemic stroke order set has been initiated, if workup is negative can discontinue  -MRI brain without  -TTE with bubble  -Aspirin 81 mg and atorvastatin 80 mg  -Allow autoregulation of blood pressure, avoid hypotension  -Lipid panel and A1c  -PT/OT/SLP evals in a.m.  -N.p.o. until cleared by speech  -Stroke neurology will follow-up in a.m.        PIERRE Gutierrez  March 29, 2025  20:51 EDT

## 2025-03-30 NOTE — THERAPY EVALUATION
Acute Care - Speech Language Pathology   Swallow Initial Evaluation [facility]  Clinical Swallow Evaluation  Cognitive-Communication Evaluation       Patient Name: Anoop Morrison  : 1950  MRN: [id number]  Today's Date: 3/30/2025               Admit Date: 3/29/2025    Visit Dx:     ICD-10-CM ICD-9-CM   1. Pneumonia of both upper lobes due to infectious organism  J18.9 486   2. Gastroenteritis  K52.9 558.9   3. Volume depletion  E86.9 276.50   4. Lethargy  R53.83 780.79   5. Hypernatremia  E87.0 276.0   6. Acute confusion  R41.0 293.0   7. Cognitive communication deficit  R41.841 799.52     Patient Active Problem List   Diagnosis    Acid reflux    Benign essential hypertension    Osteoarthritis of knee    Schizophrenia    Chronic kidney disease    AMS (altered mental status)     Past Medical History:   Diagnosis Date    Acute sinusitis      History reviewed. No pertinent surgical history.    SLP Recommendation and Plan  SLP Swallowing Diagnosis: functional oral phase, R/O pharyngeal dysphagia (25)  SLP Diet Recommendation: regular textures, thin liquids (25)  Recommended Precautions and Strategies: upright posture during/after eating, general aspiration precautions, assist with feeding (25)  SLP Rec. for Method of Medication Administration: as tolerated (25)     Monitor for Signs of Aspiration: yes, notify SLP if any concerns (25)     Swallow Criteria for Skilled Therapeutic Interventions Met: demonstrates skilled criteria (25)  Anticipated Discharge Disposition (SLP): skilled nursing facility (25)  Rehab Potential/Prognosis, Swallowing: good, to achieve stated therapy goals (25)  Therapy Frequency (Swallow): PRN (25)  Predicted Duration Therapy Intervention (Days): until discharge (25)  Oral Care Recommendations: Oral Care BID/PRN (25)                                               SWALLOW  EVALUATION (Last 72 Hours)       SLP Adult Swallow Evaluation       Row Name 03/30/25 0920                   General Information    Current Method of Nutrition NPO  -DV        Prior Level of Function-Communication unknown  -DV        Prior Level of Function-Swallowing no diet consistency restrictions;other (see comments)  per pt  -DV        Patient's Goals for Discharge return to PO diet  -DV           Oral Motor Structure and Function    Dentition Assessment natural, present and adequate  -DV        Secretion Management WNL/WFL  -DV        Mucosal Quality moist, healthy  -DV        Volitional Cough WFL  -DV           Oral Musculature and Cranial Nerve Assessment    Oral Motor General Assessment generalized oral motor weakness  -DV           General Eating/Swallowing Observations    Respiratory Support Currently in Use nasal cannula  -DV        O2 Liters 2L  -DV        Eating/Swallowing Skills fed by SLP;needed assist  -DV        Positioning During Eating upright 90 degree;upright in bed  -DV        Utensils Used spoon;cup;straw  -DV        Consistencies Trialed thin liquids;pureed;regular textures  -DV           Respiratory    Respiratory Status WFL  -DV           Clinical Swallow Eval    Oral Prep Phase WFL  -DV        Oral Transit WFL  -DV        Oral Residue WFL  -DV        Pharyngeal Phase no overt signs/symptoms of pharyngeal impairment  -DV        Clinical Swallow Evaluation Summary Pt did exhibit one cough after bite of puree and after attempting to talk. Will f/u for diet tolerance.  -DV           SLP Evaluation Clinical Impression    SLP Swallowing Diagnosis functional oral phase;R/O pharyngeal dysphagia  -DV        Functional Impact risk of aspiration/pneumonia  -DV        Rehab Potential/Prognosis, Swallowing good, to achieve stated therapy goals  -DV        Swallow Criteria for Skilled Therapeutic Interventions Met demonstrates skilled criteria  -DV           Recommendations    Therapy Frequency  (Swallow) PRN  -DV        SLP Diet Recommendation regular textures;thin liquids  -DV        Recommended Precautions and Strategies upright posture during/after eating;general aspiration precautions;assist with feeding  -DV        Oral Care Recommendations Oral Care BID/PRN  -DV        SLP Rec. for Method of Medication Administration as tolerated  -DV        Monitor for Signs of Aspiration yes;notify SLP if any concerns  -DV                  User Key  (r) = Recorded By, (t) = Taken By, (c) = Cosigned By      Initials Name Effective Dates    DV Lucia Sharp MS CCC-SLP 06/16/21 -                     EDUCATION  The patient has been educated in the following areas:   Cognitive Impairment Communication Impairment.        SLP GOALS       Row Name 03/30/25 0920             (LTG) Patient will demonstrate functional swallow for    Diet Texture (Demonstrate functional swallow) regular textures  -DV      Liquid viscosity (Demonstrate functional swallow) thin liquids  -DV      [place] (Demonstrate functional swallow) with 1:1 assist/ supervision  -DV      Time Frame (Demonstrate functional swallow) 1 week  -DV      Progress/Outcomes (Demonstrate functional swallow) new goal  -DV         (STG) Patient will tolerate trials of    Consistencies Trialed (Tolerate trials) regular textures;thin liquids  -DV      Desired Outcome (Tolerate trials) without signs/symptoms of aspiration;with adequate oral prep/transit/clearance  -DV      [place] (Tolerate trials) with 1:1 assist/ supervision  -DV      Time Frame (Tolerate trials) 1 week  -DV      Progress/Outcomes (Tolerate trials) new goal  -DV         Patient will demonstrate functional cognitive-linguistic skills for return to discharge environment    [place] with minimal cues  -DV      Time frame by discharge  -DV      Progress/Outcomes new goal  -DV         Organizational Skills Goal 1 (SLP)    Improve Thought Organization Through Goal 1 (SLP) completing a divergent  naming task;80%;with minimal cues (75-90%)  -DV      Time Frame (Thought Organization Skills Goal 1, SLP) short term goal (STG)  -DV      Progress/Outcomes (Thought Organization Skills Goal 1, SLP) new goal  -DV         Organizational Skills Goal 2 (SLP)    Improve Thought Organization Through Goal 2 (SLP) naming similarities and differences;80%;with minimal cues (75-90%)  -DV      Time Frame (Thought Organization Skills Goal 2, SLP) short term goal (STG)  -DV      Progress/Outcomes (Thought Organization Skills Goal 2, SLP) new goal  -DV      Comment (Thought Organization Skills Goal 2, SLP) targeting more prompt processing/response time  -DV                User Key  (r) = Recorded By, (t) = Taken By, (c) = Cosigned By      Initials Name Provider Type    Lucia Wills MS CCC-SLP Speech and Language Pathologist                         Time Calculation:    Time Calculation- SLP       Row Name 03/30/25 1032             Time Calculation- SLP    SLP Start Time 0920  -DV      SLP Received On 03/30/25  -DV         Untimed Charges    SLP Eval/Re-eval  ST Eval Speech and Production w/ Language - 77842;ST Eval Oral Pharyng Swallow - 74069  -DV      61822-XO Eval Speech and Production w/ Language Minutes 40  -DV      74532-AE Eval Oral Pharyng Swallow Minutes 30  -DV         Total Minutes    Untimed Charges Total Minutes 70  -DV       Total Minutes 70  -DV                User Key  (r) = Recorded By, (t) = Taken By, (c) = Cosigned By      Initials Name Provider Type    Lucia Wills MS CCC-SLP Speech and Language Pathologist                    Therapy Charges for Today       Code Description Service Date Service Provider Modifiers Qty    00829520142 HC ST EVAL ORAL PHARYNG SWALLOW 2 3/30/2025 Lucia Sharp MS CCC-SLP GN 1    [id number] HC ST EVAL SPEECH AND PROD W LANG  3 3/30/2025 Lucia Sharp MS CCC-KATHIE GN 1                 Lucia Sharp MS CCC-SLP  3/30/2025   and Acute Care - Speech Language Pathology Initial  Evaluation   [place]     Patient Name: Anoop Morrison  : 1950  MRN: [id number]  Today's Date: 3/30/2025               Admit Date: 3/29/2025     Visit Dx:    ICD-10-CM ICD-9-CM   1. Pneumonia of both upper lobes due to infectious organism  J18.9 486   2. Gastroenteritis  K52.9 558.9   3. Volume depletion  E86.9 276.50   4. Lethargy  R53.83 780.79   5. Hypernatremia  E87.0 276.0   6. Acute confusion  R41.0 293.0   7. Cognitive communication deficit  R41.841 799.52     Patient Active Problem List   Diagnosis    Acid reflux    Benign essential hypertension    Osteoarthritis of knee    Schizophrenia    Chronic kidney disease    AMS (altered mental status)     Past Medical History:   Diagnosis Date    Acute sinusitis      History reviewed. No pertinent surgical history.    SLP Recommendation and Plan  SLP Diagnosis: moderate-severe, dysarthria, mild, cognitive-linguistic disorder (25)  SLP Diagnosis Comments: Pt reports dysarthria is baseline due self-reported cerebral palsy. Pt presents with impairmed pragmatics and overall slow cognitive processing, though was able to all items accurately. Suspect pragmatic impairment is baseline, but unclear if slow processing is baseline or new. Will follow. (25)     Monitor for Signs of Aspiration: yes, notify SLP if any concerns (25)  Swallow Criteria for Skilled Therapeutic Interventions Met: demonstrates skilled criteria (25)  SLC Criteria for Skilled Therapy Interventions Met: yes (25)  Anticipated Discharge Disposition (SLP): skilled nursing facility (25)     Therapy Frequency (Swallow): PRN (25)  Therapy Frequency (SLP SLC): 5 days per week (25)  Predicted Duration Therapy Intervention (Days): until discharge (25)  Oral Care Recommendations: Oral Care BID/PRN (25)                                 SLP EVALUATION (Last 72 Hours)       SLP SLC Evaluation       Row  Name 03/30/25 0920                   Communication Assessment/Intervention    Document Type evaluation  -DV        Subjective Information no complaints  -DV        Patient Observations alert;cooperative  -DV        Patient/Family/Caregiver Comments/Observations no family present  -DV        Patient Effort good  -DV        Symptoms Noted During/After Treatment none  -DV           General Information    Patient Profile Reviewed yes  -DV        Pertinent History Of Current Problem 75yo adm from SNF w/ n/v and AMS. Recent COVID. Hx cerebral palsy (self-reported, not in chart), schizophrenia, HTN, GERD, CKS, DM2, prior CVA.  -DV        Precautions/Limitations, Vision difficult to assess  -DV        Precautions/Limitations, Hearing WFL;for purposes of eval  -DV        Prior Level of Function-Communication unknown  -DV        Plans/Goals Discussed with patient;agreed upon  -DV        Barriers to Rehab none identified  -DV        Patient's Goals for Discharge patient did not state  -DV           Pain    Pretreatment Pain Rating 0/10 - no pain  -DV        Posttreatment Pain Rating 0/10 - no pain  -DV           Comprehension Assessment/Intervention    Comprehension Assessment/Intervention Auditory Comprehension  -DV           Auditory Comprehension Assessment/Intervention    Auditory Comprehension (Communication) WFL  -DV        Answers Questions (Communication) yes/no;wh questions;WFL  -DV        Able to Follow Commands (Communication) 3-step;WFL  -DV        Narrative Discourse conversational level;WFL  -DV           Expression Assessment/Intervention    Expression Assessment/Intervention verbal expression  -DV           Verbal Expression Assessment/Intervention    Verbal Expression WFL  -DV        Confrontational Naming WFL  -DV        Spontaneous/Functional Words WFL  -DV        Sentence Formulation complex;WFL  -DV        Conversational Discourse/Fluency WFL  -DV        Verbal Expression, Comment impacted by cognitive  impairment, very delayed response time, slow processing  -DV           Motor Speech Assessment/Intervention    Motor Speech Function moderate impairment;severe impairment  -DV        Characteristics Consistent with Dysarthria slow rate;decreased intensity;decreased articulation;decreased prosody;decreased breath support  -DV        Motor Speech, Comment pt reports dyarthria is baseline due cerebral palsy  -DV           Cursory Voice Assessment/Intervention    Quality and Resonance (Voice) WFL  -DV           Cognitive Assessment Intervention- SLP    Cognitive Function (Cognition) mild impairment  -DV        Orientation Status (Cognition) awareness of basic personal information;person;place;time;situation;WFL  -DV        Memory (Cognitive) short-term;WFL  -DV        Attention (Cognitive) WFL  -DV        Thought Organization (Cognitive) concrete divergent;mild impairment  -DV        Reasoning (Cognitive) deductive;mental flexibility;WFL  -DV        Problem Solving (Cognitive) temporal;WFL  -DV        Executive Function (Cognition) initiation;mild impairment  -DV        Pragmatics (Communication) affect;moderate impairment;severe impairment;other (see comments)  suspect this is baseline  -DV           SLP Evaluation Clinical Impressions    SLP Diagnosis moderate-severe;dysarthria;mild;cognitive-linguistic disorder  -DV        SLP Diagnosis Comments Pt reports dysarthria is baseline due self-reported cerebral palsy. Pt presents with impairmed pragmatics and overall slow cognitive processing, though was able to all items accurately. Suspect pragmatic impairment is baseline, but unclear if slow processing is baseline or new. Will follow.  -DV        Rehab Potential/Prognosis good  -DV        SLC Criteria for Skilled Therapy Interventions Met yes  -DV        Functional Impact functional impact in social situations;functional impact in ADLs;difficulty communicating wants, needs;difficulty communicating in an emergency  -DV            Recommendations    Therapy Frequency (SLP SLC) 5 days per week  -DV        Predicted Duration Therapy Intervention (Days) until discharge  -DV        Anticipated Discharge Disposition (SLP) [facility]  -DV                  User Key  (r) = Recorded By, (t) = Taken By, (c) = Cosigned By      Initials Name Effective Dates    DV Lucia Sharp, MS CCC-SLP 06/16/21 -                        EDUCATION  The patient has been educated in the following areas:     Dysphagia (Swallowing Impairment).           SLP GOALS       Row Name 03/30/25 0920             (LTG) Patient will demonstrate functional swallow for    Diet Texture (Demonstrate functional swallow) regular textures  -DV      Liquid viscosity (Demonstrate functional swallow) thin liquids  -DV      [place] (Demonstrate functional swallow) with 1:1 assist/ supervision  -DV      Time Frame (Demonstrate functional swallow) 1 week  -DV      Progress/Outcomes (Demonstrate functional swallow) new goal  -DV         (STG) Patient will tolerate trials of    Consistencies Trialed (Tolerate trials) regular textures;thin liquids  -DV      Desired Outcome (Tolerate trials) without signs/symptoms of aspiration;with adequate oral prep/transit/clearance  -DV      [place] (Tolerate trials) with 1:1 assist/ supervision  -DV      Time Frame (Tolerate trials) 1 week  -DV      Progress/Outcomes (Tolerate trials) new goal  -DV         Patient will demonstrate functional cognitive-linguistic skills for return to discharge environment    [place] with minimal cues  -DV      Time frame by discharge  -DV      Progress/Outcomes new goal  -DV         Organizational Skills Goal 1 (SLP)    Improve Thought Organization Through Goal 1 (SLP) completing a divergent naming task;80%;with minimal cues (75-90%)  -DV      Time Frame (Thought Organization Skills Goal 1, SLP) short term goal (STG)  -DV      Progress/Outcomes (Thought Organization Skills Goal 1, SLP) new goal   -DV         Organizational Skills Goal 2 (SLP)    Improve Thought Organization Through Goal 2 (SLP) naming similarities and differences;80%;with minimal cues (75-90%)  -DV      Time Frame (Thought Organization Skills Goal 2, SLP) short term goal (STG)  -DV      Progress/Outcomes (Thought Organization Skills Goal 2, SLP) new goal  -DV      Comment (Thought Organization Skills Goal 2, SLP) targeting more prompt processing/response time  -DV                User Key  (r) = Recorded By, (t) = Taken By, (c) = Cosigned By      Initials Name Provider Type    DV Lucia Sharp MS CCC-SLP Speech and Language Pathologist                              Time Calculation:      Time Calculation- SLP       Row Name 03/30/25 1032             Time Calculation- SLP    SLP Start Time 0920  -DV      SLP Received On 03/30/25  -DV         Untimed Charges    SLP Eval/Re-eval  ST Eval Speech and Production w/ Language - 46206;ST Eval Oral Pharyng Swallow - 14582  -DV      [id number]-UJ Eval Speech and Production w/ Language Minutes 40  -DV      [id number]-HT Eval Oral Pharyng Swallow Minutes 30  -DV         Total Minutes    Untimed Charges Total Minutes 70  -DV       Total Minutes 70  -DV                User Key  (r) = Recorded By, (t) = Taken By, (c) = Cosigned By      Initials Name Provider Type    Lucia Wills MS CCC-SLP Speech and Language Pathologist                    Therapy Charges for Today       Code Description Service Date Service Provider Modifiers Qty    17467854974 HC ST EVAL ORAL PHARYNG SWALLOW 2 3/30/2025 Lucia Sharp MS CCC-SLP GN 1    [id number] HC ST EVAL SPEECH AND PROD W LANG  3 3/30/2025 Lucia Sharp MS CCC-KATHIE GN 1                       Lucia Sharp MS CCC-KATHIE  3/30/2025

## 2025-03-30 NOTE — H&P
[facility] Medicine Services  HISTORY AND PHYSICAL    Patient Name: Anoop Morrison  : 1950  MRN: [id number]  Primary Care Physician: Anjelica Sandoval MD  Date of admission: 3/29/2025      Subjective   Subjective     Chief Complaint:  Nausea and vomiting, diarrhea    HPI:  Anoop Morrison is a 74 y.o. male with history of hypertension, osteoarthritis, GERD, prior stroke, DM 2?,  Pulmonary fibrosis?,  CKD and schizophrenia/anxiety/depression who presents from his care facility at [facility] with nausea vomiting and diarrhea.  The patient is unable to provide details, therefore recent history provided by the patient's sister and from review of ED notes.  I was unable to contact staff at [facility] this evening.  Reportedly there has been an outbreak of norovirus at his SNF.  The patient's sister says she was told today by nursing staff that the patient developed nausea vomiting and diarrhea today, and that they plan to send Mr. Morrison to the ED for further evaluation.  He was also reportedly more confused than usual today.  The sister also states that Mr. Morrison suffered from a COVID-19 infection approximately 1-2 weeks ago.      Personal History     Past Medical History:   Diagnosis Date    Acute sinusitis            No past surgical history on file.    Family History: family history includes Arthritis in an other family member; Cancer in an other family member; Diabetes in an other family member; Heart attack in an other family member; Hypertension in an other family member; Stroke in an other family member.     Social History:  reports that he has never smoked. He has never used smokeless tobacco. He reports that he does not drink alcohol.  Social History     Social History Narrative    Not on file       Medications:  Available home medication information reviewed.  amLODIPine, diazePAM, fluticasone, meloxicam, omeprazole, and thioridazine    Allergies   Allergen Reactions     Other        Objective   Objective     Vital Signs:   Temp:  [98.1 °F (36.7 °C)] 98.1 °F (36.7 °C)  Heart Rate:  [86-91] 86  Resp:  [16] 16  BP: (109-127)/(65-72) 109/65  Flow (L/min) (Oxygen Therapy):  [2] 2  Total (NIH Stroke Scale): 2    Physical Exam   Appears fatigued, in bed  Mucous membranes moist  RRR  Breath sounds grossly clear, poor effort  Abdomen soft, nontender  Somnolent, will awaken briefly to loud voice and will say his name  Speech dysarthric  Possible contractures left arm and hand  Flat affect    Result Review:  I have personally reviewed the results from the time of this admission to 3/29/2025 22:02 EDT and agree with these findings:  [x]  Laboratory list / accordion  []  Microbiology  [x]  Radiology  [x]  EKG/Telemetry   []  Cardiology/Vascular   []  Pathology  [x]  Old records  []  Other:  Most notable findings include:       LAB RESULTS:      Lab 03/29/25  1853   WBC 6.58   HEMOGLOBIN 12.4*   HEMATOCRIT 40.1   PLATELETS 157   NEUTROS ABS 5.94   IMMATURE GRANS (ABS) 0.02   LYMPHS ABS 0.31*   MONOS ABS 0.29   EOS ABS 0.00   MCV 93.9   LACTATE 2.2*   PROTIME 14.7*   INR 1.14*   APTT 30.2         Lab 03/29/25  1853   SODIUM 150*   POTASSIUM 4.7   CHLORIDE 106   CO2 31.0*   ANION GAP 13.0   BUN 36*   CREATININE 0.95   EGFR 85.0   GLUCOSE 139*   CALCIUM 8.8         Lab 03/29/25  1853   ALT (SGPT) 10   AST (SGOT) 13                         Microbiology Results (last 10 days)       ** No results found for the last 240 hours. **            XR Chest 1 View  Result Date: 3/29/2025  XR CHEST 1 VW Date of Exam: 3/29/2025 7:58 PM EDT Indication: Acute Stroke Protocol (onset < 12 hrs) Comparison: None available. Findings: Heart is enlarged. There is atherosclerotic disease in the aorta. Pulmonary vascularity is normal. There is some mild scarring/atelectasis/infiltrate in the lower left lung. There is also probably some scarring in the left upper lobe and right upper lobe. No pneumothorax. Consider upright  PA and lateral chest x-ray when clinically feasible.     Impression: 1.Cardiomegaly. 2.Areas of scarring/atelectasis/infiltrate in the lower left lung. 3.Probable scarring in the upper lobes. Electronically Signed: Kota Nunez MD  3/29/2025 8:30 PM EDT  Workstation ID: [id number]    CT Angiogram Head w AI Analysis of LVO  Result Date: 3/29/2025  CT ANGIOGRAM HEAD W AI ANALYSIS OF LVO, CT ANGIOGRAM NECK Date of Exam: 3/29/2025 6:46 PM EDT Indication: Neuro Deficit, acute, Stroke suspected Neuro deficit, acute stroke suspected. Comparison: None available. Technique: CTA of the head and neck was performed after the uneventful intravenous administration of 125 cc Isovue-370. Reconstructed coronal and sagittal images were also obtained. In addition, a 3-D volume rendered image was created for interpretation.  Automated exposure control and iterative reconstruction methods were used. Findings: CTA NECK: *Aortic arch: No aneurysm. No significant stenosis or occlusion of the major arch vessels. *Left carotid system: No aneurysm, significant stenosis or occlusion. *Right carotid system: No aneurysm, significant stenosis or occlusion. *Vertebrobasilar system: The vertebral arteries arise from their respective subclavian arteries. No aneurysm, significant stenosis or occlusion. *Mosaic attenuation of the lung apices. Correlate for pulmonary infectious or inflammatory process. CTA HEAD: *Anterior circulation: No aneurysm, significant stenosis or occlusion. 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 *Posterior circulation: No aneurysm, significant stenosis or occlusion. *Anatomic variants: None of significance. *Venous sinuses: Patent.     Impression: 1.No hemodynamically significant stenosis, large vessel cut off or aneurysms in the intracranial circulation 2.No hemodynamically significant stenosis,  dissection or aneurysms in the extracranial circulation. 3.Mosaic attenuation of the upper lungs which may reflect underlying infectious or inflammatory process. Electronically Signed: Diego Martinez MD  3/29/2025 7:38 PM EDT  Workstation ID: [id number]    CT Angiogram Neck  Result Date: 3/29/2025  CT ANGIOGRAM HEAD W AI ANALYSIS OF LVO, CT ANGIOGRAM NECK Date of Exam: 3/29/2025 6:46 PM EDT Indication: Neuro Deficit, acute, Stroke suspected Neuro deficit, acute stroke suspected. Comparison: None available. Technique: CTA of the head and neck was performed after the uneventful intravenous administration of 125 cc Isovue-370. Reconstructed coronal and sagittal images were also obtained. In addition, a 3-D volume rendered image was created for interpretation.  Automated exposure control and iterative reconstruction methods were used. Findings: CTA NECK: *Aortic arch: No aneurysm. No significant stenosis or occlusion of the major arch vessels. *Left carotid system: No aneurysm, significant stenosis or occlusion. *Right carotid system: No aneurysm, significant stenosis or occlusion. *Vertebrobasilar system: The vertebral arteries arise from their respective subclavian arteries. No aneurysm, significant stenosis or occlusion. *Mosaic attenuation of the lung apices. Correlate for pulmonary infectious or inflammatory process. CTA HEAD: *Anterior circulation: No aneurysm, significant stenosis or occlusion. 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 *Posterior circulation: No aneurysm, significant stenosis or occlusion. *Anatomic variants: None of significance. *Venous sinuses: Patent.     Impression: 1.No hemodynamically significant stenosis, large vessel cut off or aneurysms in the intracranial circulation 2.No hemodynamically significant stenosis, dissection or aneurysms in the extracranial circulation.  3.Mosaic attenuation of the upper lungs which may reflect underlying infectious or inflammatory process. Electronically Signed: Diego Martinez MD  3/29/2025 7:38 PM EDT  Workstation ID: [id number]    CT CEREBRAL PERFUSION WITH & WITHOUT CONTRAST  Result Date: 3/29/2025  CT CEREBRAL PERFUSION W WO CONTRAST Date of Exam: 3/29/2025 6:46 PM EDT Indication: Neuro Deficit, acute, Stroke suspected Neuro deficit, acute stroke suspected.  Comparison: None available. Technique: Axial CT images of the brain were obtained prior to and after the administration of 125 cc Isovue-370. Core blood volume, core blood flow, mean transit time, and Tmax images were obtained utilizing the Rapid software protocol. A limited CT angiogram of the head was also performed to measure the blood vessel density. The radiation dose reduction device was turned on for each scan per the ALARA (As Low as Reasonably Achievable) protocol. Findings:  Cerebral blood flow, blood volume, and mean transit time maps are symmetric without large perfusion defect.  CBF<30% volume: 0mL Tmax>6sec volume: 0 mL Mismatch volume: 0mL Mismatch ratio: None       Impression:  1. No evidence of core infarct nor ischemic brain at risk. Electronically Signed: Diego Martinez MD  3/29/2025 7:35 PM EDT  Workstation ID: [id number]    CT Abdomen Pelvis With Contrast  Result Date: 3/29/2025  CT ABDOMEN PELVIS W CONTRAST Date of Exam: 3/29/2025 6:51 PM EDT Indication: Vomiting diarrhea, abdominal pain. Comparison: None available. Technique: Axial CT images were obtained of the abdomen and pelvis following the uneventful intravenous administration of 125 cc Isovue-370. Reconstructed coronal and sagittal images were also obtained. Automated exposure control and iterative construction methods were used. Findings: LUNG BASES: Minimal basal atelectasis/parenchymal scarring. LIVER: Few scattered subcentimeter hypodensities likely representing cysts although too small to adequately characterize.  BILIARY/GALLBLADDER:  Unremarkable SPLEEN:  Unremarkable PANCREAS:  Unremarkable ADRENAL:  Unremarkable KIDNEYS:  Unremarkable parenchyma with no solid mass identified. No obstruction.  No calculus identified. GASTROINTESTINAL/MESENTERY:  No evidence of obstruction nor inflammation.  The appendix is normal. MESENTERIC VESSELS:  Patent. AORTA/IVC:  Normal caliber. RETROPERITONEUM/LYMPH NODES:  Unremarkable REPRODUCTIVE:  Unremarkable BLADDER: There is mild nonspecific urinary bladder mural thickening. Correlate for signs of cystitis. OSSEUS STRUCTURES: No acute osseous process identified. Chronic posttraumatic changes involving the right femoral neck related to previous fracture. Potential old left femoral neck fracture as well. There is generalized osteopenia and degenerative change  throughout the lumbar spine. Absence versus resection of the distal coccyx. Correlate with history.     Impression: Impression: 1.Mild nonspecific urinary bladder mural thickening. Correlate for signs of cystitis. 2.No acute inflammatory process identified in the abdomen or pelvis. 3.Chronic changes as above. Electronically Signed: Diego Martinez MD  3/29/2025 7:26 PM EDT  Workstation ID: [id number]    CT Head Without Contrast Stroke Protocol  Result Date: 3/29/2025  CT HEAD WO CONTRAST STROKE PROTOCOL Date of Exam: 3/29/2025 6:38 PM EDT Indication: Neuro deficit, acute, stroke suspected Neuro Deficit, acute, Stroke suspected. Comparison: None available. Technique: Axial CT images were obtained of the head without contrast administration.  Reconstructed coronal images were also obtained. Automated exposure control and iterative construction methods were used. Scan Time: 6:42 p.m. Results discussed with stroke navigator at 6:48 p.m. Findings: There is no evidence of acute territorial infarction. There is no acute intracranial hemorrhage. There are no extra-axial collections. Ventricles and CSF spaces are symmetric. No mass effect nor  hydrocephalus. Brain parenchyma appears normal for age.  There is scattered mucosal thickening within the inferior recesses of the maxillary sinuses.  Osseous structures and orbits appear intact.     Impression: Impression: No acute intracranial findings. Electronically Signed: Diego Martinez MD  3/29/2025 6:48 PM EDT  Workstation ID: [id number]          Assessment & Plan   Assessment & Plan       AMS (altered mental status)    Hypernatremia  Nausea, vomiting and diarrhea  -Reported norovirus outbreak at the patient's care facility, will check GI panel and place the patient on contact precautions  -Mr. Morrison has received bolus of IV fluids (2 L of normal saline) in the ED, continue normal saline at 75 mL/hour and obtain serial sodium levels, with further adjustments to fluids as appropriate    Recent COVID-19 infection  Interstitial lung disease  -Check COVID-19 antigen, likely removed from precautions of negative  -Chest x-ray and CT neck both show possible infiltrates, consider aspiration secondary to N/V --however these imaging findings may be chronic in nature and may instead relate to the pulmonary fibrosis seen on prior CT chest imaging, or to his recent COVID-19 infection.  - will continue rocephin and doxycycline for now, check procalcitonin level    AMS  - patient followed by stroke neurology, CT imaging without acute ischemic changes, MRI brain pending.  - speech consult  - check ABG   - check depakote level    HTN  - hypotensive at admission, holding home meds    Generalized weakness  - PT/OT    DMII  - check A1c  - glucose checks qac and HS, add SSI if needed    Schizophrenia  Anxiety and Depression  - resume home meds (likely in am when patient awake and able to take PO)  - depakote/trazodone/risperidone and lurasidone with recent fill histories, may need to review specific dosing with John.    CKD  - cr 0.95      VTE Prophylaxis:  Pharmacologic & mechanical VTE prophylaxis orders are  present.          CODE STATUS:  Full code per sister (she says she is the patient's healthcare POA and makes all medical decisions for him)  Code Status and Medical Interventions: CPR (Attempt to Resuscitate); Full Support   Ordered at: 03/29/25 2200     Code Status (Patient has no pulse and is not breathing):    CPR (Attempt to Resuscitate)     Medical Interventions (Patient has pulse or is breathing):    Full Support     Level Of Support Discussed With:    Next of Kin (If No Surrogate)       Health Care Surrogate       Expected Discharge   Expected discharge date/ time has not been documented.     Lorne Merlos MD  03/29/25

## 2025-03-31 ENCOUNTER — APPOINTMENT (OUTPATIENT)
Dept: CARDIOLOGY | Facility: HOSPITAL | Age: 75
End: 2025-03-31
Payer: MEDICARE

## 2025-03-31 ENCOUNTER — APPOINTMENT (OUTPATIENT)
Dept: GENERAL RADIOLOGY | Facility: HOSPITAL | Age: 75
End: 2025-03-31
Payer: MEDICARE

## 2025-03-31 LAB
ALBUMIN SERPL-MCNC: 3.4 G/DL (ref 3.5–5.2)
ALBUMIN/GLOB SERPL: 1.4 G/DL
ALP SERPL-CCNC: 50 U/L (ref 39–117)
ALT SERPL W P-5'-P-CCNC: 11 U/L (ref 1–41)
ANION GAP SERPL CALCULATED.3IONS-SCNC: 8 MMOL/L (ref 5–15)
AORTIC DIMENSIONLESS INDEX: 0.73 (DI)
ASCENDING AORTA: 3.1 CM
AST SERPL-CCNC: 14 U/L (ref 1–40)
AV MEAN PRESS GRAD SYS DOP V1V2: 4 MMHG
AV VMAX SYS DOP: 141 CM/SEC
BH CV ECHO MEAS - AO MAX PG: 8 MMHG
BH CV ECHO MEAS - AO ROOT DIAM: 3.3 CM
BH CV ECHO MEAS - AO V2 VTI: 27.2 CM
BH CV ECHO MEAS - AVA(I,D): 2.8 CM2
BH CV ECHO MEAS - EDV(CUBED): 110.6 ML
BH CV ECHO MEAS - EDV(MOD-SP2): 99.7 ML
BH CV ECHO MEAS - EDV(MOD-SP4): 96.4 ML
BH CV ECHO MEAS - EF(MOD-SP2): 60.8 %
BH CV ECHO MEAS - EF(MOD-SP4): 61.9 %
BH CV ECHO MEAS - ESV(CUBED): 32.8 ML
BH CV ECHO MEAS - ESV(MOD-SP2): 39.1 ML
BH CV ECHO MEAS - ESV(MOD-SP4): 36.7 ML
BH CV ECHO MEAS - FS: 33.3 %
BH CV ECHO MEAS - IVS/LVPW: 1 CM
BH CV ECHO MEAS - IVSD: 1.1 CM
BH CV ECHO MEAS - LA DIMENSION: 3.7 CM
BH CV ECHO MEAS - LAT PEAK E' VEL: 9.4 CM/SEC
BH CV ECHO MEAS - LV MASS(C)D: 194 GRAMS
BH CV ECHO MEAS - LV MAX PG: 4.3 MMHG
BH CV ECHO MEAS - LV MEAN PG: 2 MMHG
BH CV ECHO MEAS - LV V1 MAX: 103.1 CM/SEC
BH CV ECHO MEAS - LV V1 VTI: 19.8 CM
BH CV ECHO MEAS - LVIDD: 4.8 CM
BH CV ECHO MEAS - LVIDS: 3.2 CM
BH CV ECHO MEAS - LVOT AREA: 3.8 CM2
BH CV ECHO MEAS - LVOT DIAM: 2.2 CM
BH CV ECHO MEAS - LVPWD: 1.1 CM
BH CV ECHO MEAS - MED PEAK E' VEL: 5.4 CM/SEC
BH CV ECHO MEAS - MV A MAX VEL: 89.7 CM/SEC
BH CV ECHO MEAS - MV DEC SLOPE: 362 CM/SEC2
BH CV ECHO MEAS - MV DEC TIME: 0.19 SEC
BH CV ECHO MEAS - MV E MAX VEL: 73 CM/SEC
BH CV ECHO MEAS - MV E/A: 0.81
BH CV ECHO MEAS - MV MAX PG: 6 MMHG
BH CV ECHO MEAS - MV MEAN PG: 4 MMHG
BH CV ECHO MEAS - MV P1/2T: 99.5 MSEC
BH CV ECHO MEAS - MV V2 VTI: 30.6 CM
BH CV ECHO MEAS - MVA(P1/2T): 2.21 CM2
BH CV ECHO MEAS - MVA(VTI): 2.45 CM2
BH CV ECHO MEAS - PA ACC TIME: 0.1 SEC
BH CV ECHO MEAS - PA V2 MAX: 108 CM/SEC
BH CV ECHO MEAS - PI END-D VEL: 134.5 CM/SEC
BH CV ECHO MEAS - SV(LVOT): 75.1 ML
BH CV ECHO MEAS - SV(MOD-SP2): 60.6 ML
BH CV ECHO MEAS - SV(MOD-SP4): 59.7 ML
BH CV ECHO MEAS - TAPSE (>1.6): 2.8 CM
BH CV ECHO MEASUREMENTS AVERAGE E/E' RATIO: 9.86
BH CV ECHO SHUNT ASSESSMENT PERFORMED (HIDDEN SCRIPTING): 1
BH CV VAS BP RIGHT ARM: NORMAL MMHG
BH CV XLRA - RV BASE: 3.4 CM
BH CV XLRA - RV LENGTH: 7.1 CM
BH CV XLRA - RV MID: 2.9 CM
BH CV XLRA - TDI S': 15.2 CM/SEC
BILIRUB SERPL-MCNC: 0.3 MG/DL (ref 0–1.2)
BUN SERPL-MCNC: 20 MG/DL (ref 8–23)
BUN/CREAT SERPL: 35.1 (ref 7–25)
CALCIUM SPEC-SCNC: 8.4 MG/DL (ref 8.6–10.5)
CHLORIDE SERPL-SCNC: 105 MMOL/L (ref 98–107)
CO2 SERPL-SCNC: 31 MMOL/L (ref 22–29)
CREAT SERPL-MCNC: 0.57 MG/DL (ref 0.76–1.27)
DEPRECATED RDW RBC AUTO: 49.5 FL (ref 37–54)
EGFRCR SERPLBLD CKD-EPI 2021: 104.2 ML/MIN/1.73
ERYTHROCYTE [DISTWIDTH] IN BLOOD BY AUTOMATED COUNT: 14.6 % (ref 12.3–15.4)
FOLATE SERPL-MCNC: 6.84 NG/ML (ref 4.78–24.2)
GLOBULIN UR ELPH-MCNC: 2.4 GM/DL
GLUCOSE BLDC GLUCOMTR-MCNC: 111 MG/DL (ref 70–130)
GLUCOSE BLDC GLUCOMTR-MCNC: 118 MG/DL (ref 70–130)
GLUCOSE BLDC GLUCOMTR-MCNC: 127 MG/DL (ref 70–130)
GLUCOSE BLDC GLUCOMTR-MCNC: 151 MG/DL (ref 70–130)
GLUCOSE SERPL-MCNC: 140 MG/DL (ref 65–99)
HCT VFR BLD AUTO: 35.5 % (ref 37.5–51)
HGB BLD-MCNC: 11 G/DL (ref 13–17.7)
IVRT: 71 MS
LEFT ATRIUM VOLUME INDEX: 28.7 ML/M2
LV EF BIPLANE MOD: 61.8 %
MCH RBC QN AUTO: 28.8 PG (ref 26.6–33)
MCHC RBC AUTO-ENTMCNC: 31 G/DL (ref 31.5–35.7)
MCV RBC AUTO: 92.9 FL (ref 79–97)
PLATELET # BLD AUTO: 118 10*3/MM3 (ref 140–450)
PMV BLD AUTO: 10.5 FL (ref 6–12)
POTASSIUM SERPL-SCNC: 4.2 MMOL/L (ref 3.5–5.2)
PROT SERPL-MCNC: 5.8 G/DL (ref 6–8.5)
RBC # BLD AUTO: 3.82 10*6/MM3 (ref 4.14–5.8)
SODIUM SERPL-SCNC: 144 MMOL/L (ref 136–145)
VIT B12 BLD-MCNC: 511 PG/ML (ref 211–946)
WBC NRBC COR # BLD AUTO: 3.86 10*3/MM3 (ref 3.4–10.8)

## 2025-03-31 PROCEDURE — 25010000002 HEPARIN (PORCINE) PER 1000 UNITS: Performed by: INTERNAL MEDICINE

## 2025-03-31 PROCEDURE — 94799 UNLISTED PULMONARY SVC/PX: CPT

## 2025-03-31 PROCEDURE — 93306 TTE W/DOPPLER COMPLETE: CPT | Performed by: INTERNAL MEDICINE

## 2025-03-31 PROCEDURE — 82746 ASSAY OF FOLIC ACID SERUM: CPT

## 2025-03-31 PROCEDURE — 94761 N-INVAS EAR/PLS OXIMETRY MLT: CPT

## 2025-03-31 PROCEDURE — 97530 THERAPEUTIC ACTIVITIES: CPT

## 2025-03-31 PROCEDURE — 97535 SELF CARE MNGMENT TRAINING: CPT

## 2025-03-31 PROCEDURE — 85027 COMPLETE CBC AUTOMATED: CPT | Performed by: HOSPITALIST

## 2025-03-31 PROCEDURE — 80053 COMPREHEN METABOLIC PANEL: CPT | Performed by: HOSPITALIST

## 2025-03-31 PROCEDURE — 94664 DEMO&/EVAL PT USE INHALER: CPT

## 2025-03-31 PROCEDURE — 71045 X-RAY EXAM CHEST 1 VIEW: CPT

## 2025-03-31 PROCEDURE — 25010000002 SULFUR HEXAFLUORIDE MICROSPH 60.7-25 MG RECONSTITUTED SUSPENSION: Performed by: NURSE PRACTITIONER

## 2025-03-31 PROCEDURE — 82948 REAGENT STRIP/BLOOD GLUCOSE: CPT

## 2025-03-31 PROCEDURE — 82607 VITAMIN B-12: CPT

## 2025-03-31 PROCEDURE — 99233 SBSQ HOSP IP/OBS HIGH 50: CPT

## 2025-03-31 PROCEDURE — 99232 SBSQ HOSP IP/OBS MODERATE 35: CPT | Performed by: HOSPITALIST

## 2025-03-31 PROCEDURE — 93306 TTE W/DOPPLER COMPLETE: CPT

## 2025-03-31 PROCEDURE — 63710000001 INSULIN LISPRO (HUMAN) PER 5 UNITS: Performed by: HOSPITALIST

## 2025-03-31 PROCEDURE — 25010000002 CEFTRIAXONE PER 250 MG: Performed by: INTERNAL MEDICINE

## 2025-03-31 PROCEDURE — 94660 CPAP INITIATION&MGMT: CPT

## 2025-03-31 PROCEDURE — 97166 OT EVAL MOD COMPLEX 45 MIN: CPT

## 2025-03-31 RX ORDER — LURASIDONE HYDROCHLORIDE 40 MG/1
40 TABLET, FILM COATED ORAL DAILY
COMMUNITY

## 2025-03-31 RX ORDER — POLYETHYLENE GLYCOL 3350 17 G/17G
17 POWDER, FOR SOLUTION ORAL DAILY PRN
COMMUNITY

## 2025-03-31 RX ORDER — IPRATROPIUM BROMIDE AND ALBUTEROL SULFATE 2.5; .5 MG/3ML; MG/3ML
3 SOLUTION RESPIRATORY (INHALATION) EVERY 4 HOURS PRN
COMMUNITY

## 2025-03-31 RX ORDER — RISPERIDONE 0.25 MG/1
0.25 TABLET ORAL 3 TIMES DAILY PRN
COMMUNITY
Start: 2025-03-19

## 2025-03-31 RX ORDER — INSULIN GLARGINE 100 [IU]/ML
20 INJECTION, SOLUTION SUBCUTANEOUS 2 TIMES DAILY
Status: ON HOLD | COMMUNITY
End: 2025-03-31 | Stop reason: SDUPTHER

## 2025-03-31 RX ORDER — HYDRALAZINE HYDROCHLORIDE 50 MG/1
50 TABLET, FILM COATED ORAL 2 TIMES DAILY
COMMUNITY

## 2025-03-31 RX ORDER — SENNOSIDES A AND B 8.6 MG/1
1 TABLET, FILM COATED ORAL DAILY PRN
COMMUNITY
End: 2025-04-01 | Stop reason: HOSPADM

## 2025-03-31 RX ORDER — DOXYCYCLINE 100 MG/1
100 CAPSULE ORAL EVERY 12 HOURS SCHEDULED
Status: DISCONTINUED | OUTPATIENT
Start: 2025-03-31 | End: 2025-04-01 | Stop reason: HOSPADM

## 2025-03-31 RX ORDER — RISPERIDONE 1 MG/1
2 TABLET ORAL NIGHTLY
Status: DISCONTINUED | OUTPATIENT
Start: 2025-03-31 | End: 2025-04-01 | Stop reason: HOSPADM

## 2025-03-31 RX ORDER — TRAZODONE HYDROCHLORIDE 50 MG/1
25 TABLET ORAL NIGHTLY
COMMUNITY

## 2025-03-31 RX ORDER — AMLODIPINE BESYLATE 5 MG/1
5 TABLET ORAL DAILY
Status: DISCONTINUED | OUTPATIENT
Start: 2025-03-31 | End: 2025-04-01 | Stop reason: HOSPADM

## 2025-03-31 RX ORDER — FERROUS SULFATE 325(65) MG
325 TABLET ORAL
COMMUNITY

## 2025-03-31 RX ORDER — DIVALPROEX SODIUM 125 MG/1
250 CAPSULE, COATED PELLETS ORAL NIGHTLY
Status: DISCONTINUED | OUTPATIENT
Start: 2025-03-31 | End: 2025-04-01 | Stop reason: HOSPADM

## 2025-03-31 RX ORDER — INSULIN GLARGINE 100 [IU]/ML
20 INJECTION, SOLUTION SUBCUTANEOUS 2 TIMES DAILY
Status: ON HOLD | COMMUNITY
Start: 2025-02-14 | End: 2025-04-01

## 2025-03-31 RX ORDER — ONDANSETRON 4 MG/1
4 TABLET, ORALLY DISINTEGRATING ORAL EVERY 8 HOURS PRN
COMMUNITY

## 2025-03-31 RX ORDER — ROSUVASTATIN CALCIUM 10 MG/1
10 TABLET, COATED ORAL NIGHTLY
COMMUNITY

## 2025-03-31 RX ORDER — ACETAMINOPHEN 160 MG
2000 TABLET,DISINTEGRATING ORAL DAILY
COMMUNITY

## 2025-03-31 RX ORDER — CARVEDILOL 12.5 MG/1
12.5 TABLET ORAL 2 TIMES DAILY WITH MEALS
Status: DISCONTINUED | OUTPATIENT
Start: 2025-03-31 | End: 2025-04-01 | Stop reason: HOSPADM

## 2025-03-31 RX ORDER — TRAZODONE HYDROCHLORIDE 50 MG/1
25 TABLET ORAL NIGHTLY
Status: DISCONTINUED | OUTPATIENT
Start: 2025-03-31 | End: 2025-04-01 | Stop reason: HOSPADM

## 2025-03-31 RX ORDER — LURASIDONE HYDROCHLORIDE 20 MG/1
40 TABLET, FILM COATED ORAL DAILY
Status: DISCONTINUED | OUTPATIENT
Start: 2025-03-31 | End: 2025-04-01 | Stop reason: HOSPADM

## 2025-03-31 RX ORDER — RISPERIDONE 2 MG/1
2 TABLET ORAL EVERY 12 HOURS SCHEDULED
COMMUNITY

## 2025-03-31 RX ORDER — PANTOPRAZOLE SODIUM 40 MG/1
40 TABLET, DELAYED RELEASE ORAL DAILY
COMMUNITY

## 2025-03-31 RX ORDER — CARVEDILOL 12.5 MG/1
12.5 TABLET ORAL 2 TIMES DAILY WITH MEALS
COMMUNITY

## 2025-03-31 RX ORDER — DIVALPROEX SODIUM 125 MG/1
250 CAPSULE, COATED PELLETS ORAL NIGHTLY
COMMUNITY

## 2025-03-31 RX ORDER — BISACODYL 10 MG
10 SUPPOSITORY, RECTAL RECTAL AS NEEDED
COMMUNITY

## 2025-03-31 RX ORDER — DIVALPROEX SODIUM 125 MG/1
250 TABLET, DELAYED RELEASE ORAL NIGHTLY
Status: ON HOLD | COMMUNITY
End: 2025-03-31 | Stop reason: CLARIF

## 2025-03-31 RX ORDER — CETIRIZINE HYDROCHLORIDE 10 MG/1
10 TABLET ORAL DAILY
COMMUNITY

## 2025-03-31 RX ORDER — ICOSAPENT ETHYL 1 G/1
1 CAPSULE ORAL 2 TIMES DAILY WITH MEALS
COMMUNITY

## 2025-03-31 RX ORDER — PYRITHIONE ZINC 0.01 G/ML
1 SHAMPOO TOPICAL DAILY PRN
COMMUNITY

## 2025-03-31 RX ORDER — ACETAMINOPHEN 500 MG
1000 TABLET ORAL 3 TIMES DAILY PRN
COMMUNITY

## 2025-03-31 RX ADMIN — DIVALPROEX SODIUM 250 MG: 125 CAPSULE ORAL at 21:19

## 2025-03-31 RX ADMIN — Medication 10 ML: at 08:50

## 2025-03-31 RX ADMIN — INSULIN LISPRO 2 UNITS: 100 INJECTION, SOLUTION INTRAVENOUS; SUBCUTANEOUS at 21:19

## 2025-03-31 RX ADMIN — HEPARIN SODIUM 5000 UNITS: 5000 INJECTION INTRAVENOUS; SUBCUTANEOUS at 21:19

## 2025-03-31 RX ADMIN — TRAZODONE HYDROCHLORIDE 25 MG: 50 TABLET ORAL at 21:19

## 2025-03-31 RX ADMIN — ATORVASTATIN CALCIUM 80 MG: 40 TABLET, FILM COATED ORAL at 21:19

## 2025-03-31 RX ADMIN — DOXYCYCLINE 100 MG: 100 CAPSULE ORAL at 21:19

## 2025-03-31 RX ADMIN — LURASIDONE HYDROCHLORIDE 40 MG: 20 TABLET, FILM COATED ORAL at 17:10

## 2025-03-31 RX ADMIN — AMLODIPINE BESYLATE 5 MG: 5 TABLET ORAL at 17:10

## 2025-03-31 RX ADMIN — CEFTRIAXONE 2000 MG: 2 INJECTION, POWDER, FOR SOLUTION INTRAMUSCULAR; INTRAVENOUS at 21:20

## 2025-03-31 RX ADMIN — HEPARIN SODIUM 5000 UNITS: 5000 INJECTION INTRAVENOUS; SUBCUTANEOUS at 05:20

## 2025-03-31 RX ADMIN — SULFUR HEXAFLUORIDE 2 ML: KIT at 09:47

## 2025-03-31 RX ADMIN — RISPERIDONE 2 MG: 1 TABLET ORAL at 21:21

## 2025-03-31 RX ADMIN — CARVEDILOL 12.5 MG: 12.5 TABLET, FILM COATED ORAL at 17:10

## 2025-03-31 RX ADMIN — ASPIRIN 81 MG CHEWABLE TABLET 81 MG: 81 TABLET CHEWABLE at 08:49

## 2025-03-31 RX ADMIN — IPRATROPIUM BROMIDE AND ALBUTEROL SULFATE 3 ML: 2.5; .5 SOLUTION RESPIRATORY (INHALATION) at 19:42

## 2025-03-31 RX ADMIN — DOXYCYCLINE 100 MG: 100 CAPSULE ORAL at 10:34

## 2025-03-31 RX ADMIN — GUAIFENESIN 600 MG: 600 TABLET, EXTENDED RELEASE ORAL at 21:20

## 2025-03-31 RX ADMIN — GUAIFENESIN 600 MG: 600 TABLET, EXTENDED RELEASE ORAL at 08:50

## 2025-03-31 RX ADMIN — Medication 10 ML: at 21:21

## 2025-03-31 RX ADMIN — IPRATROPIUM BROMIDE AND ALBUTEROL SULFATE 3 ML: 2.5; .5 SOLUTION RESPIRATORY (INHALATION) at 07:02

## 2025-03-31 RX ADMIN — HEPARIN SODIUM 5000 UNITS: 5000 INJECTION INTRAVENOUS; SUBCUTANEOUS at 13:34

## 2025-03-31 NOTE — PLAN OF CARE
Goal Outcome Evaluation:  Plan of Care Reviewed With: (P) patient           Outcome Evaluation: (P) Pt presents near baseline with generalized weakness (L>R), decreased activity tolerance, and decreased ADL participation, warranting IP OT services. Pt uses lift to w/c at baseline. Pt engaged in feeding, provided and educated on AE this date. Return to [facility] at d/c.    Anticipated Discharge Disposition (OT): (P) assisted living

## 2025-03-31 NOTE — PROGRESS NOTES
" [facility] Neurology    Progress Note    Patient Name: Anoop Morrison  : 1950  MRN: [id number]  Primary Care Physician:  Anjelica Sandoval MD  Date of admission: 3/29/2025    Subjective     Chief Complaint: Encephalopathy, recent COVID, norovirus, negative MRI    History of Present Illness   patient was seen resting comfortably in chair.  Able to follow complex commands.  Oriented x 4.  Able to state his week backwards.  States he has not \"felt well since COVID\".  No nausea or vomiting throughout shift.  He denies pain.  Unclear what patient's baseline is.    Review of Systems   General: Negative for fever, nausea, or vomiting.   Neurological: Negative for headache, pain, or weakness.     Objective     Physical Exam  Vitals and nursing note reviewed.   Constitutional:       General: He is not in acute distress.     Appearance: He is not ill-appearing.   Eyes:      General: No visual field deficit.     Extraocular Movements: Extraocular movements intact.      Pupils: Pupils are equal, round, and reactive to light.      Comments: No nystagmus or deviated gaze noted   Neurological:      Mental Status: He is alert and oriented to person, place, and time.      Cranial Nerves: Cranial nerve deficit and facial asymmetry present. No dysarthria.      Sensory: Sensory deficit present.      Motor: Weakness and abnormal muscle tone present. No tremor or seizure activity.      Coordination: Finger-Nose-Finger Test normal.      Deep Tendon Reflexes:      Reflex Scores:       Bicep reflexes are 1+ on the right side and 0 on the left side.       Patellar reflexes are 0 on the right side and 0 on the left side.     Comments:     Cranial Nerves   CN II: Pupils are equal, round, and reactive to light. Normal visual acuity and visual fields.    CN III IV VI: Extraocular movements are full without nystagmus.  CN V: Normal facial sensation and strength of muscles of mastication.  CN VII: Left-sided facial droop noted  CN VIII: "  Auditory acuity is normal.  CN IX & X:  Symmetric palatal movement.  CN XII: The tongue is midline.  No atrophy or fasciculations.    Motor: Abnormal tone of left hand, generalized weakness    Reflexes: Unequivocal Babinski's            Vitals:   Temp:  [97.9 °F (36.6 °C)-98.4 °F (36.9 °C)] 98.3 °F (36.8 °C)  Heart Rate:  [69-86] 69  Resp:  [16-18] 18  BP: (149-194)/(71-91) 168/75  Flow (L/min) (Oxygen Therapy):  [2-3] 3    Current Medications    Current Facility-Administered Medications:     acetaminophen (TYLENOL) tablet 650 mg, 650 mg, Oral, Q4H PRN, 650 mg at 03/30/25 2300 **OR** acetaminophen (TYLENOL) 160 MG/5ML oral solution 650 mg, 650 mg, Oral, Q4H PRN **OR** acetaminophen (TYLENOL) suppository 650 mg, 650 mg, Rectal, Q4H PRN, Lorne Merlos MD    aspirin chewable tablet 81 mg, 81 mg, Oral, Daily, 81 mg at 03/31/25 0849 **OR** aspirin suppository 300 mg, 300 mg, Rectal, Daily, Maggie Bustamante APRN, 300 mg at 03/29/25 2023    atorvastatin (LIPITOR) tablet 80 mg, 80 mg, Oral, Nightly, Maggie Bustamante APRN, 80 mg at 03/30/25 2120    sennosides-docusate (PERICOLACE) 8.6-50 MG per tablet 2 tablet, 2 tablet, Oral, BID PRN **AND** polyethylene glycol (MIRALAX) packet 17 g, 17 g, Oral, Daily PRN **AND** bisacodyl (DULCOLAX) EC tablet 5 mg, 5 mg, Oral, Daily PRN **AND** bisacodyl (DULCOLAX) suppository 10 mg, 10 mg, Rectal, Daily PRN, Lorne Merlos MD    cefTRIAXone (ROCEPHIN) 2,000 mg in sodium chloride 0.9 % 100 mL MBP, 2,000 mg, Intravenous, Q24H, Lorne Merlos MD, Last Rate: 200 mL/hr at 03/30/25 2120, 2,000 mg at 03/30/25 2120    dextrose (D50W) (25 g/50 mL) IV injection 25 g, 25 g, Intravenous, Q15 Min PRN, Jason Dodd MD    dextrose (GLUTOSE) oral gel 15 g, 15 g, Oral, Q15 Min PRN, Jason Dodd MD    doxycycline (MONODOX) capsule 100 mg, 100 mg, Oral, Q12H, Mary Ann Porras, PharmD    glucagon (GLUCAGEN) injection 1 mg, 1 mg, Intramuscular, Q15 Min PRN, Jason Dodd MD    guaiFENesin  (MUCINEX) 12 hr tablet 600 mg, 600 mg, Oral, Q12H, Jason Dodd MD, 600 mg at 03/31/25 0850    heparin (porcine) 5000 UNIT/ML injection 5,000 Units, 5,000 Units, Subcutaneous, Q8H, Lorne Merlos MD, 5,000 Units at 03/31/25 0520    Insulin Lispro (humaLOG) injection 2-7 Units, 2-7 Units, Subcutaneous, 4x Daily AC & at Bedtime, Jason Dodd MD    ipratropium-albuterol (DUO-NEB) nebulizer solution 3 mL, 3 mL, Nebulization, Q6H While Awake - RT, Jason Dodd MD, 3 mL at 03/31/25 0702    ipratropium-albuterol (DUO-NEB) nebulizer solution 3 mL, 3 mL, Nebulization, Q4H PRN, Jason Dodd MD    Med Rec Consult, , Not Applicable, Continuous PRN, Lorne Merlos MD    Pharmacy Meds to Bed Consult, , Not Applicable, Daily, Lorne Merlos MD    sodium chloride 0.9 % flush 10 mL, 10 mL, Intravenous, PRN, Diego Mcconnell MD, 10 mL at 03/29/25 2243    sodium chloride 0.9 % flush 10 mL, 10 mL, Intravenous, Q12H, Lorne Merlos MD, 10 mL at 03/31/25 0850    sodium chloride 0.9 % flush 10 mL, 10 mL, Intravenous, PRN, Lorne Merlos MD    sodium chloride 0.9 % infusion 40 mL, 40 mL, Intravenous, Gaurang IBARRA Walker E, MD    Laboratory Results:   Lab Results   Component Value Date    GLUCOSE 117 (H) 03/30/2025    CALCIUM 7.9 (L) 03/30/2025     (H) 03/30/2025    K 4.2 03/30/2025    CO2 29.0 03/30/2025     (H) 03/30/2025    BUN 28 (H) 03/30/2025    CREATININE 0.70 (L) 03/30/2025    EGFRIFNONA 51 (L) 02/20/2018    BCR 40.0 (H) 03/30/2025    ANIONGAP 10.0 03/30/2025     Lab Results   Component Value Date    WBC 3.70 03/30/2025    HGB 11.0 (L) 03/30/2025    HCT 36.0 (L) 03/30/2025    MCV 95.5 03/30/2025     (L) 03/30/2025     Lab Results   Component Value Date    CHOL 80 03/30/2025    CHOL 196 02/20/2018    CHOL 191 08/08/2017     Lab Results   Component Value Date    HDL 30 (L) 03/30/2025    HDL 44 02/20/2018    HDL 49 03/08/2016     Lab Results   Component Value Date    LDL 24 03/30/2025    LDL  "97 02/20/2018    LDL 83 03/08/2016     Lab Results   Component Value Date    TRIG 156 (H) 03/30/2025    TRIG 343 (H) 02/20/2018    TRIG 219 (H) 03/08/2016     Lab Results   Component Value Date    HGBA1C 5.70 (H) 03/30/2025     Lab Results   Component Value Date    INR 1.14 (H) 03/29/2025    PROTIME 14.7 (H) 03/29/2025     No results found for: \"FOLATE\"  No results found for: \"[id number]\"    MRI Brain Without Contrast  Result Date: 3/30/2025  MRI BRAIN WO CONTRAST Date of Exam: 3/30/2025 7:29 PM EDT Indication: Stroke, follow up Left facial droop and dysarthria.  Comparison: CT perfusion March 29, 2025, CT head March 29, 2025 Technique:  Routine multiplanar/multisequence sequence images of the brain were obtained without contrast administration. Findings: There is no restricted diffusion. The lateral and third ventricles seem prominent in size unchanged. There is some minimal periventricular and subcortical white matter changes which while nonspecific could be seen with small vessel ischemic change. There  is some cerebral atrophy. There is no definite orbital abnormality. Pituitary is not enlarged. There is paranasal sinus disease. There also is a right mastoid disease. There is no unusual blooming on susceptibility imaging.     Impression: Impression: 1.There are some minimal white matter changes which while nonspecific could be seen with small vessel ischemic change. 2.There is some cerebral atrophy. 3.There is some paranasal sinus disease and right mastoid disease. Electronically Signed: Abner Singh MD  3/30/2025 8:00 PM EDT  Workstation ID: [id number]       Assessment / Plan     Active Hospital Problems:    AMS (altered mental status)       Brief Patient Summary:  Anoop Morrison is a 74 y.o. male who has history of hypertension, OA, GERD, previous stroke, T2DM, pulmonary fibrosis, CKD, schizophrenia, anxiety and depression who [facility] after experiencing nausea and vomiting with " diarrhea.  Per patient's sister there has been an outbreak of norovirus at SNF.  She was told by nursing staff that patient developed nausea and vomiting with diarrhea approximately 12 hours prior to presentation.  He also continues to be more confused than usual.  Of note he also had COVID-19 proximately 1 to 2 weeks ago.    He was originally seen by our stroke navigators, with a negative workup.  CT head without acute process.  CTP showed no perfusion deficit.  CTAs of head and neck showed no LVO or flow-limiting stenosis.  MRI brain showed minimal chronic small vessel ischemic changes global cerebral atrophy is noted.  Patient also has right mastoid sinus disease.    Per chart review patient currently takes Depakote, trazodone, Risperdal and lurasidone for schizophrenia, anxiety and depression.    Plan:   Altered mental status/metabolic encephalopathy  Previous CVA  Schizophrenia  Anxiety and depression  Unclear patient is back to his neurologic baseline.  Will attempt to reach out to patient's sister for better confirmation.  Patient is oriented x 4 and able to state his week backwards.  Considered EEG however patient back to baseline.  Restart patient's home schizophrenia medication including Depakote 250 mg nightly, risperidone 0.75 mg nightly, trazodone 25 mg nightly and Lurasidone 40 mg daily (per my chart review of patient's fill history)  Continue fall precautions  Patient to continue work with PT/OT  General Neurology will continue to follow    I have discussed the above with the patient, bedside RN and Dr. Yanez  Time spent with patient: 50 minutes in face-to-face evaluation and management of the patient.      PIERRE Suazo

## 2025-03-31 NOTE — CASE MANAGEMENT/SOCIAL WORK
Discharge Planning Assessment  [facility]     Patient Name: Anoop Morrison  MRN: [id number]  Today's Date: 3/31/2025    Admit Date: 3/29/2025    Plan: Longterm Care at [facility]   Discharge Needs Assessment       Row Name 03/31/25 1253       Living Environment    People in Home facility resident    Current Living Arrangements extended care facility    Potentially Unsafe Housing Conditions none    Provides Primary Care For no one, unable/limited ability to care for self    Quality of Family Relationships supportive    Able to Return to Prior Arrangements yes       Resource/Environmental Concerns    Transportation Concerns none       Transition Planning    Patient/Family Anticipates Transition to long-term care facility    Patient/Family Anticipated Services at Transition skilled nursing    Transportation Anticipated health plan transportation  likely will need ambulance or wheelchair van       Discharge Needs Assessment    Equipment Currently Used at Home wheelchair    Concerns to be Addressed no discharge needs identified    Equipment Needed After Discharge none    Discharge Facility/Level of Care Needs nursing facility, intermediate    Provided Post Acute Provider List? N/A    N/A Provider List Comment Already a longterm care resident at [facility] place    Provided Post Acute Provider Quality & Resource List? N/A                   Discharge Plan       Row Name 03/31/25 1257       Plan    Plan Longterm Care at [facility]    Patient/Family in Agreement with Plan yes    Provided Post Acute Provider List? N/A    Provided Post Acute Provider Quality & Resource List? N/A    Plan Comments MSW spoke with pt's sister and POGIN, Sonali. Sonali reports pt is longterm care at [facility] and goal will be to return when pt is ready. Pt uses a wheelchair and usually a lift to get into wheelchair at basline at facility. Pt will likely need ambulance for transportation back to facility when ready. MSW called and spoke with Lydia in  admissions at [facility]. Pt has a bed hold and able to return there when ready. MSW continues to follow for discharge needs.    Final Discharge Disposition Code 04 - intermediate care facility                  Continued Care and Services - Admitted Since 3/29/2025       Destination       Service Provider Request Status Services Address Phone Fax Patient Preferred    [facility] Pending - No Request Sent -- [address], [facility] [phone] --                     Demographic Summary       Row Name 03/31/25 1252       General Information    Reason for Consult discharge planning                   Functional Status       Row Name 03/31/25 1252       Functional Status, IADL    Medications assistive equipment and person    Meal Preparation assistive equipment and person    Housekeeping assistive equipment and person    Laundry assistive equipment and person    Shopping assistive equipment and person                   Psychosocial    No documentation.                  Abuse/Neglect    No documentation.                  Legal    No documentation.                  Substance Abuse    No documentation.                  Patient Forms    No documentation.                     NINA Gaitan

## 2025-03-31 NOTE — PROGRESS NOTES
[facility] Medicine Services  PROGRESS NOTE    Patient Name: Anoop Morrison  : 1950  MRN: [id number]    Date of Admission: 3/29/2025  Primary Care Physician: Anjelica Sandoval MD    Subjective   Subjective     CC:  F/U AMS    HPI:  Seen this morning, up in chair, no complaints.       Objective   Objective     Vital Signs:   Temp:  [97.9 °F (36.6 °C)-98.3 °F (36.8 °C)] 98.3 °F (36.8 °C)  Heart Rate:  [69-86] 78  Resp:  [16-18] 18  BP: (149-194)/(67-91) 155/67  Flow (L/min) (Oxygen Therapy):  [1-3] 2     Physical Exam:  Gen-no acute distress, chronically ill appearing  HENT-NCAT, mucous membranes moist  CV-RRR, S1 S2 normal, no m/r/g  Resp-CTAB, no wheezes or rales  Abd-soft, NT, ND, +BS  Ext-no edema  Neuro-A&Ox3 currently, slightly dysarthric speech  Skin-no rashes  Psych-appropriate mood      Results Reviewed:  LAB RESULTS:      Lab 25  1144 25  0844 250 25  1853   WBC 3.86 3.70  --  6.58   HEMOGLOBIN 11.0* 11.0*  --  12.4*   HEMATOCRIT 35.5* 36.0*  --  40.1   PLATELETS 118* 108*  --  157   NEUTROS ABS  --   --   --  5.94   IMMATURE GRANS (ABS)  --   --   --  0.02   LYMPHS ABS  --   --   --  0.31*   MONOS ABS  --   --   --  0.29   EOS ABS  --   --   --  0.00   MCV 92.9 95.5  --  93.9   PROCALCITONIN  --  0.24  --   --    LACTATE  --   --  1.3 2.2*   PROTIME  --   --   --  14.7*   APTT  --   --   --  30.2         Lab 25  1144 25  0844 25  0142 25  1853   SODIUM 144 146* 151* 147* 150*   POTASSIUM 4.2  --  4.2  --  4.7   CHLORIDE 105  --  112*  --  106   CO2 31.0*  --  29.0  --  31.0*   ANION GAP 8.0  --  10.0  --  13.0   BUN 20  --  28*  --  36*   CREATININE 0.57*  --  0.70*  --  0.95   EGFR 104.2  --  97.9  --  85.0   GLUCOSE 140*  --  117*  --  139*   CALCIUM 8.4*  --  7.9*  --  8.8   HEMOGLOBIN A1C  --   --  5.70*  --   --    TSH  --   --  0.932  --   --          Lab 25  1144 25  2741   TOTAL PROTEIN  5.8*  --    ALBUMIN 3.4*  --    GLOBULIN 2.4  --    ALT (SGPT) 11 10   AST (SGOT) 14 13   BILIRUBIN 0.3  --    ALK PHOS 50  --          Lab 03/30/25  0844 03/29/25  1853   PROBNP 245.0  --    PROTIME  --  14.7*   INR  --  1.14*         Lab 03/30/25  0844   CHOLESTEROL 80   LDL CHOL 24   HDL CHOL 30*   TRIGLYCERIDES 156*             Lab 03/30/25  0416 03/29/25  2300   PH, ARTERIAL 7.352 7.353   PCO2, ARTERIAL 62.0* 63.4*   PO2 .0* 85.2   FIO2 38 28   HCO3 ART 34.3* 35.2*   BASE EXCESS ART 7.0* 7.8*   CARBOXYHEMOGLOBIN 1.3 1.4     Brief Urine Lab Results       None            Microbiology Results Abnormal       None            XR Chest 1 View  Result Date: 3/31/2025  XR CHEST 1 VW Date of Exam: 3/31/2025 2:53 AM EDT Indication: DYSPNEA, ON EXERTION Comparison: 3/30/2025 Findings: Heart remains enlarged. There is chronic scarring in the upper lobes. Lung volumes appear lower, and there is increased infiltrate or atelectasis in the bases, left greater than right. No pneumothorax identified.     Impression: Lower lung volumes with increased infiltrate or atelectasis in the bases, left greater than right. Electronically Signed: Kota Nunez MD  3/31/2025 6:39 AM EDT  Workstation ID: [id number]    MRI Brain Without Contrast  Result Date: 3/30/2025  MRI BRAIN WO CONTRAST Date of Exam: 3/30/2025 7:29 PM EDT Indication: Stroke, follow up Left facial droop and dysarthria.  Comparison: CT perfusion March 29, 2025, CT head March 29, 2025 Technique:  Routine multiplanar/multisequence sequence images of the brain were obtained without contrast administration. Findings: There is no restricted diffusion. The lateral and third ventricles seem prominent in size unchanged. There is some minimal periventricular and subcortical white matter changes which while nonspecific could be seen with small vessel ischemic change. There  is some cerebral atrophy. There is no definite orbital abnormality. Pituitary is not enlarged. There is  paranasal sinus disease. There also is a right mastoid disease. There is no unusual blooming on susceptibility imaging.     Impression: Impression: 1.There are some minimal white matter changes which while nonspecific could be seen with small vessel ischemic change. 2.There is some cerebral atrophy. 3.There is some paranasal sinus disease and right mastoid disease. Electronically Signed: Abner Singh MD  3/30/2025 8:00 PM EDT  Workstation ID: [id number]    XR Chest 1 View  Result Date: 3/30/2025  XR CHEST 1 VW Date of Exam: 3/30/2025 2:31 AM EDT Indication: possible pneumonia, known pulmonary fibrosis Comparison: 3/29/2025 Findings: Cardiomegaly is unchanged. There is continued scarring in the upper lobes. There is improved aeration at the left lung base. No pneumothorax is seen.     Impression: 1.Improved aeration at the left lung base. 2.Stable scarring in the upper lobes. Electronically Signed: Kota Nunez MD  3/30/2025 5:31 AM EDT  Workstation ID: [id number]    XR Chest 1 View  Result Date: 3/29/2025  XR CHEST 1 VW Date of Exam: 3/29/2025 7:58 PM EDT Indication: Acute Stroke Protocol (onset < 12 hrs) Comparison: None available. Findings: Heart is enlarged. There is atherosclerotic disease in the aorta. Pulmonary vascularity is normal. There is some mild scarring/atelectasis/infiltrate in the lower left lung. There is also probably some scarring in the left upper lobe and right upper lobe. No pneumothorax. Consider upright PA and lateral chest x-ray when clinically feasible.     Impression: 1.Cardiomegaly. 2.Areas of scarring/atelectasis/infiltrate in the lower left lung. 3.Probable scarring in the upper lobes. Electronically Signed: Kota Nunez MD  3/29/2025 8:30 PM EDT  Workstation ID: [id number]    CT Angiogram Head w AI Analysis of LVO  Result Date: 3/29/2025  CT ANGIOGRAM HEAD W AI ANALYSIS OF LVO, CT ANGIOGRAM NECK Date of Exam: 3/29/2025 6:46 PM EDT Indication: Neuro Deficit, acute, Stroke suspected  Neuro deficit, acute stroke suspected. Comparison: None available. Technique: CTA of the head and neck was performed after the uneventful intravenous administration of 125 cc Isovue-370. Reconstructed coronal and sagittal images were also obtained. In addition, a 3-D volume rendered image was created for interpretation.  Automated exposure control and iterative reconstruction methods were used. Findings: CTA NECK: *Aortic arch: No aneurysm. No significant stenosis or occlusion of the major arch vessels. *Left carotid system: No aneurysm, significant stenosis or occlusion. *Right carotid system: No aneurysm, significant stenosis or occlusion. *Vertebrobasilar system: The vertebral arteries arise from their respective subclavian arteries. No aneurysm, significant stenosis or occlusion. *Mosaic attenuation of the lung apices. Correlate for pulmonary infectious or inflammatory process. CTA HEAD: *Anterior circulation: No aneurysm, significant stenosis or occlusion. 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 *Posterior circulation: No aneurysm, significant stenosis or occlusion. *Anatomic variants: None of significance. *Venous sinuses: Patent.     Impression: 1.No hemodynamically significant stenosis, large vessel cut off or aneurysms in the intracranial circulation 2.No hemodynamically significant stenosis, dissection or aneurysms in the extracranial circulation. 3.Mosaic attenuation of the upper lungs which may reflect underlying infectious or inflammatory process. Electronically Signed: Diego Martinez MD  3/29/2025 7:38 PM EDT  Workstation ID: [id number]    CT Angiogram Neck  Result Date: 3/29/2025  CT ANGIOGRAM HEAD W AI ANALYSIS OF LVO, CT ANGIOGRAM NECK Date of Exam: 3/29/2025 6:46 PM EDT Indication: Neuro Deficit, acute, Stroke suspected Neuro deficit, acute stroke suspected. Comparison: None  available. Technique: CTA of the head and neck was performed after the uneventful intravenous administration of 125 cc Isovue-370. Reconstructed coronal and sagittal images were also obtained. In addition, a 3-D volume rendered image was created for interpretation.  Automated exposure control and iterative reconstruction methods were used. Findings: CTA NECK: *Aortic arch: No aneurysm. No significant stenosis or occlusion of the major arch vessels. *Left carotid system: No aneurysm, significant stenosis or occlusion. *Right carotid system: No aneurysm, significant stenosis or occlusion. *Vertebrobasilar system: The vertebral arteries arise from their respective subclavian arteries. No aneurysm, significant stenosis or occlusion. *Mosaic attenuation of the lung apices. Correlate for pulmonary infectious or inflammatory process. CTA HEAD: *Anterior circulation: No aneurysm, significant stenosis or occlusion. 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 *Posterior circulation: No aneurysm, significant stenosis or occlusion. *Anatomic variants: None of significance. *Venous sinuses: Patent.     Impression: 1.No hemodynamically significant stenosis, large vessel cut off or aneurysms in the intracranial circulation 2.No hemodynamically significant stenosis, dissection or aneurysms in the extracranial circulation. 3.Mosaic attenuation of the upper lungs which may reflect underlying infectious or inflammatory process. Electronically Signed: Diego Martinez MD  3/29/2025 7:38 PM EDT  Workstation ID: [id number]    CT CEREBRAL PERFUSION WITH & WITHOUT CONTRAST  Result Date: 3/29/2025  CT CEREBRAL PERFUSION W WO CONTRAST Date of Exam: 3/29/2025 6:46 PM EDT Indication: Neuro Deficit, acute, Stroke suspected Neuro deficit, acute stroke suspected.  Comparison: None available. Technique: Axial CT images of the brain were  obtained prior to and after the administration of 125 cc Isovue-370. Core blood volume, core blood flow, mean transit time, and Tmax images were obtained utilizing the Rapid software protocol. A limited CT angiogram of the head was also performed to measure the blood vessel density. The radiation dose reduction device was turned on for each scan per the ALARA (As Low as Reasonably Achievable) protocol. Findings:  Cerebral blood flow, blood volume, and mean transit time maps are symmetric without large perfusion defect.  CBF<30% volume: 0mL Tmax>6sec volume: 0 mL Mismatch volume: 0mL Mismatch ratio: None       Impression:  1. No evidence of core infarct nor ischemic brain at risk. Electronically Signed: Diego Martinez MD  3/29/2025 7:35 PM EDT  Workstation ID: [id number]    CT Abdomen Pelvis With Contrast  Result Date: 3/29/2025  CT ABDOMEN PELVIS W CONTRAST Date of Exam: 3/29/2025 6:51 PM EDT Indication: Vomiting diarrhea, abdominal pain. Comparison: None available. Technique: Axial CT images were obtained of the abdomen and pelvis following the uneventful intravenous administration of 125 cc Isovue-370. Reconstructed coronal and sagittal images were also obtained. Automated exposure control and iterative construction methods were used. Findings: LUNG BASES: Minimal basal atelectasis/parenchymal scarring. LIVER: Few scattered subcentimeter hypodensities likely representing cysts although too small to adequately characterize. BILIARY/GALLBLADDER:  Unremarkable SPLEEN:  Unremarkable PANCREAS:  Unremarkable ADRENAL:  Unremarkable KIDNEYS:  Unremarkable parenchyma with no solid mass identified. No obstruction.  No calculus identified. GASTROINTESTINAL/MESENTERY:  No evidence of obstruction nor inflammation.  The appendix is normal. MESENTERIC VESSELS:  Patent. AORTA/IVC:  Normal caliber. RETROPERITONEUM/LYMPH NODES:  Unremarkable REPRODUCTIVE:  Unremarkable BLADDER: There is mild nonspecific urinary bladder mural  thickening. Correlate for signs of cystitis. OSSEUS STRUCTURES: No acute osseous process identified. Chronic posttraumatic changes involving the right femoral neck related to previous fracture. Potential old left femoral neck fracture as well. There is generalized osteopenia and degenerative change  throughout the lumbar spine. Absence versus resection of the distal coccyx. Correlate with history.     Impression: Impression: 1.Mild nonspecific urinary bladder mural thickening. Correlate for signs of cystitis. 2.No acute inflammatory process identified in the abdomen or pelvis. 3.Chronic changes as above. Electronically Signed: Diego Martinez MD  3/29/2025 7:26 PM EDT  Workstation ID: [id number]    CT Head Without Contrast Stroke Protocol  Result Date: 3/29/2025  CT HEAD WO CONTRAST STROKE PROTOCOL Date of Exam: 3/29/2025 6:38 PM EDT Indication: Neuro deficit, acute, stroke suspected Neuro Deficit, acute, Stroke suspected. Comparison: None available. Technique: Axial CT images were obtained of the head without contrast administration.  Reconstructed coronal images were also obtained. Automated exposure control and iterative construction methods were used. Scan Time: 6:42 p.m. Results discussed with stroke navigator at 6:48 p.m. Findings: There is no evidence of acute territorial infarction. There is no acute intracranial hemorrhage. There are no extra-axial collections. Ventricles and CSF spaces are symmetric. No mass effect nor hydrocephalus. Brain parenchyma appears normal for age.  There is scattered mucosal thickening within the inferior recesses of the maxillary sinuses.  Osseous structures and orbits appear intact.     Impression: Impression: No acute intracranial findings. Electronically Signed: Diego Martinez MD  3/29/2025 6:48 PM EDT  Workstation ID: [id number]      Results for orders placed during the hospital encounter of 03/29/25    Adult Transthoracic Echo Complete W/ Cont if Necessary Per Protocol (With  Agitated Saline)    Interpretation Summary    Left ventricular systolic function is normal. Calculated left ventricular EF = 61.8% Normal left ventricular cavity size and wall thickness noted. All left ventricular wall segments contract normally. Normal left atrial pressure.    Normal right ventricular cavity size and systolic function noted.    Saline test for shunting not performed. Patient study was not clear enough to add the bubble study.    The aortic valve exhibits sclerosis without stenosis. The aortic valve appears trileaflet. No aortic valve regurgitation is present.    No mitral valve regurgitation or significant stenosis is present. Mild mitral annular calcification is present.    The tricuspid valve is structurally normal with no significant regurgitation or significant stenosis present.      Current medications:  Scheduled Meds:amLODIPine, 5 mg, Oral, Daily  aspirin, 81 mg, Oral, Daily   Or  aspirin, 300 mg, Rectal, Daily  atorvastatin, 80 mg, Oral, Nightly  carvedilol, 12.5 mg, Oral, BID With Meals  cefTRIAXone, 2,000 mg, Intravenous, Q24H  Divalproex Sodium, 250 mg, Oral, Nightly  doxycycline, 100 mg, Oral, Q12H  guaiFENesin, 600 mg, Oral, Q12H  heparin (porcine), 5,000 Units, Subcutaneous, Q8H  insulin lispro, 2-7 Units, Subcutaneous, 4x Daily AC & at Bedtime  ipratropium-albuterol, 3 mL, Nebulization, Q6H While Awake - RT  Lurasidone HCl, 40 mg, Oral, Daily  Pharmacy Meds to Bed Consult, , Not Applicable, Daily  risperiDONE, 2 mg, Oral, Nightly  sodium chloride, 10 mL, Intravenous, Q12H  traZODone, 25 mg, Oral, Nightly      Continuous Infusions:pharmacy consult - MTM,       PRN Meds:.  acetaminophen **OR** acetaminophen **OR** acetaminophen    senna-docusate sodium **AND** polyethylene glycol **AND** bisacodyl **AND** bisacodyl    dextrose    dextrose    glucagon (human recombinant)    ipratropium-albuterol    pharmacy consult - MTM    sodium chloride    sodium chloride    sodium  chloride    Assessment & Plan   Assessment & Plan     Active Hospital Problems    Diagnosis  POA    **AMS (altered mental status) [R41.82]  Yes      Resolved Hospital Problems   No resolved problems to display.        Brief Hospital Course to date:  Anoop Morrison is a 74 y.o. male with history of hypertension, osteoarthritis, GERD, prior stroke, DM2, pulmonary fibrosis, CKD, and schizophrenia/anxiety/depression who presents from [facility] with nausea, vomiting, diarrhea, and confusion.    This patient's problems and plans were partially entered by my partner and updated as appropriate by me 03/31/25. Copied text in this note has been reviewed and is accurate as of today's date.       Altered mental status, likely secondary to metabolic encephalopathy  -CT head and MRI brain no acute findings  -Appreciate Neurology evaluation - patient back to baseline at this time  -Suspect metabolic encephalopathy given acute illnesses (COVID, GI virus)  -Resume home meds for psych diagnoses (see below)    Hypernatremia  Nausea, vomiting, and diarrhea  -Reported Norovirus outbreak at the patient's care facility, GI panel pending collection  -s/p IV fluids with improvement in sodium     Recent COVID-19 infection  Interstitial lung disease  -Negative COVID-19 antigen, no isolation needed at this time  -CXR and CT neck both show possible infiltrates, consider aspiration secondary to N/V, however these imaging findings may be chronic in nature and may instead relate to the pulmonary fibrosis seen on prior CT chest imaging, or to his recent COVID-19 infection  -WBC and procal WNL  -Will continue Rocephin and Doxycycline for now     HTN  -Hypotensive at admission so held home meds initially  -BP improved now, will resume home meds as tolerated and monitor     Generalized weakness  -PT/OT     DMII  -HbA1c 5.7%  -SSI     Schizophrenia  Anxiety and Depression  -Resume home meds      CKD, unknown baseline  -Baseline has varied, was 1.4  back in 2018 (no other recent labs for comparison)  -Cr 0.95 on admission here, monitor    Expected Discharge Location and Transportation: back to facility  Expected Discharge anticipate could be medically ready for DC by tomorrow  Expected Discharge Date: 4/1/2025; Expected Discharge Time:      VTE Prophylaxis:  Pharmacologic & mechanical VTE prophylaxis orders are present.         AM-PAC 6 Clicks Score (PT): 8 (03/31/25 0800)    CODE STATUS:   Code Status and Medical Interventions: CPR (Attempt to Resuscitate); Full Support   Ordered at: 03/29/25 2200     Code Status (Patient has no pulse and is not breathing):    CPR (Attempt to Resuscitate)     Medical Interventions (Patient has pulse or is breathing):    Full Support     Level Of Support Discussed With:    Next of Kin (If No Surrogate)       Health Care Surrogate       Bree Yanez MD  03/31/25

## 2025-03-31 NOTE — THERAPY EVALUATION
Patient Name: Anoop Morrison  : 1950    MRN: [id number]                              Today's Date: 3/31/2025       Admit Date: 3/29/2025    Visit Dx:     ICD-10-CM ICD-9-CM   1. Pneumonia of both upper lobes due to infectious organism  J18.9 486   2. Gastroenteritis  K52.9 558.9   3. Volume depletion  E86.9 276.50   4. Lethargy  R53.83 780.79   5. Hypernatremia  E87.0 276.0   6. Acute confusion  R41.0 293.0   7. Cognitive communication deficit  R41.841 799.52     Patient Active Problem List   Diagnosis    Acid reflux    Benign essential hypertension    Osteoarthritis of knee    Schizophrenia    Chronic kidney disease    AMS (altered mental status)     Past Medical History:   Diagnosis Date    Acute sinusitis      History reviewed. No pertinent surgical history.   General Information       Row Name 25          OT Time and Intention    Document Type evaluation (P)   -     Mode of Treatment occupational therapy (P)   -       Row Name 25          General Information    Patient Profile Reviewed yes (P)   -     Prior Level of Function mod assist:;feeding;grooming;max assist:;dressing;bathing;ADL's;dependent:;transfer;w/c or scooter;driving (P)   Pt uses w/c at baseline with lift transfers.  -     Existing Precautions/Restrictions fall;oxygen therapy device and L/min;other (see comments) (P)   CPAP, generalized weakness (L>R)  -     Barriers to Rehab medically complex;previous functional deficit;cognitive status (P)   -       Row Name 25          Occupational Profile    Occupational History/Life Experiences (Occupational Profile) Pt resides at [facility] (P)   -       Row Name 25          Living Environment    Current Living Arrangements assisted living facility (P)   -     People in Home facility resident (P)   -       Row Name 25          Cognition    Orientation Status (Cognition) oriented to;person;place;time (P)   -       Row Name  03/31/25 0911          Safety Issues/Impairments Affecting Functional Mobility    Safety Issues Affecting Function (Mobility) ability to follow commands;safety precaution awareness;problem-solving;safety precautions follow-through/compliance;sequencing abilities (P)   -SJ     Impairments Affecting Function (Mobility) balance;cognition;coordination;endurance/activity tolerance;grasp;muscle tone abnormal;shortness of breath;strength (P)   -SJ     Cognitive Impairments, Mobility Safety/Performance attention;judgment;problem-solving/reasoning;safety precaution awareness;safety precaution follow-through;sequencing abilities (P)   -SJ               User Key  (r) = Recorded By, (t) = Taken By, (c) = Cosigned By      Initials Name Provider Type    SJ Toya Toribio, OT Student OT Student                     Mobility/ADL's       Row Name 03/31/25 0916          Bed Mobility    Bed Mobility rolling left;rolling right (P)   -SJ     [place] (Bed Mobility) moderate assist (50% patient effort);2 person assist (P)   -SJ     Rolling Right [place] (Bed Mobility) moderate assist (50% patient effort);2 person assist (P)   -SJ     Bed Mobility, Safety Issues decreased use of arms for pushing/pulling;decreased use of legs for bridging/pushing (P)   -SJ     Assistive Device (Bed Mobility) repositioning sheet (P)   -SJ       Row Name 03/31/25 0916          Transfers    Transfers bed-chair transfer (P)   -SJ       Row Name 03/31/25 0916          Bed-Chair Transfer    Bed-[place]air [place] (Transfers) dependent (less than 25% patient effort);2 person assist (P)   -SJ     Assistive Device (Bed-Chair Transfers) lift device (P)   -SJ       Row Name 03/31/25 0916          Activities of Daily Living    BADL Assessment/Intervention grooming;feeding;lower body dressing (P)   -SJ       Row Name 03/31/25 0916          Grooming Assessment/Training    [place] Level (Grooming) wash face, hands;set up (P)   -SJ     Position  (Grooming) supported sitting (P)   -[facility] Name 03/31/25 0916          Self-Feeding Assessment/Training    [place] Level (Feeding) liquids to mouth;prepare tray/open items;scoop food and bring to mouth;set up (P)   -     Assistive Devices (Feeding) adapted cup;built-up handle utensils (P)   -     Position (Feeding) supported sitting (P)   -     Comment, (Feeding) Pt educated on AE for feeding and demonstrated proper use. (P)   -[facility] Name 03/31/25 0916          Lower Body Dressing Assessment/Training    [place] Level (Lower Body Dressing) don;socks;dependent (less than 25% patient effort) (P)   -     Position (Lower Body Dressing) supine (P)   -               User Key  (r) = Recorded By, (t) = Taken By, (c) = Cosigned By      Initials Name Provider Type     Toya Toribio OT Student OT Student                   Obj/Interventions       [facility] Name 03/31/25 0920          Sensory Assessment (Somatosensory)    Sensory Assessment (Somatosensory) unable/difficult to assess (P)   -     Sensory Assessment Intermittent responses and attention to light touch on BUEs (P)   -[facility] Name 03/31/25 0920          Vision Assessment/Intervention    Visual Impairment/Limitations visual/perceptual impairments present (P)   -     Visual Motor Impairment visual tracking, left;visual tracking, right (P)   -[facility] Name 03/31/25 0920          Range of Motion Comprehensive    Comment, General Range of Motion BUE AROM limited by strength, PROM WFL (P)   -[facility] Name 03/31/25 0920          Strength Comprehensive (MMT)    General Manual Muscle Testing (MMT) Assessment upper extremity strength deficits identified (P)   -     Comment, General Manual Muscle Testing (MMT) Assessment RUE grossly 3-/5, LUE grossly 2/5 (P)   -       Row Name 03/31/25 0920          Shoulder (Therapeutic Exercise)    Shoulder (Therapeutic Exercise) AAROM (active assistive range of motion) (P)   -      Shoulder AAROM (Therapeutic Exercise) flexion;scapular elevation;3 repetitions (P)   -       Row Name 03/31/25 0920          Elbow/Forearm (Therapeutic Exercise)    Elbow/Forearm (Therapeutic Exercise) AAROM (active assistive range of motion) (P)   -     Elbow/Forearm AAROM (Therapeutic Exercise) flexion;extension;3 repetitions (P)   -       Row Name 03/31/25 0920          Motor Skills    Therapeutic Exercise shoulder;elbow/forearm (P)   -               User Key  (r) = Recorded By, (t) = Taken By, (c) = Cosigned By      Initials Name Provider Type     Toya Toribio, OT Student OT Student                   Goals/Plan       Row Name 03/31/25 0937          Bed Mobility Goal 1 (OT)    Activity/Assistive Device (Bed Mobility Goal 1, OT) rolling to left;rolling to right (P)   -SJ     [place] Level/Cues Needed (Bed Mobility Goal 1, OT) minimum assist (75% or more patient effort);verbal cues required (P)   -SJ     Time Frame (Bed Mobility Goal 1, OT) 5 days;short term goal (STG) (P)   -SJ     Progress/Outcomes (Bed Mobility Goal 1, OT) new goal (P)   -       Row Name 03/31/25 0937          Bathing Goal 1 (OT)    Activity/Device (Bathing Goal 1, OT) -- (P)   -SJ     [place] Level/Cues Needed (Bathing Goal 1, OT) -- (P)   -SJ     Time Frame (Bathing Goal 1, OT) -- (P)   -SJ     Progress/Outcomes (Bathing Goal 1, OT) new goal (P)   -       Row Name 03/31/25 0937          Dressing Goal 1 (OT)    Activity/Device (Dressing Goal 1, OT) upper body dressing (P)   -SJ     [place]/Cues Needed (Dressing Goal 1, OT) moderate assist (50-74% patient effort) (P)   -SJ     Time Frame (Dressing Goal 1, OT) long term goal (LTG);10 days (P)   -SJ     Progress/Outcome (Dressing Goal 1, OT) new goal (P)   -       Row Name 03/31/25 0937          Self-Feeding Goal 1 (OT)    Activity/Device (Self-Feeding Goal 1, OT) scoop food and bring to mouth;adapted cup (P)   -SJ     [place] Level/Cues Needed (Self-Feeding  Goal 1, OT) standby assist (P)   -     Time Frame (Self-Feeding Goal 1, OT) 10 days;long term goal (LTG) (P)   -SJ     Progress/Outcomes (Self-Feeding Goal 1, OT) new goal (P)   -       Row Name 03/31/25 0937          Therapy Assessment/Plan (OT)    Planned Therapy Interventions (OT) activity tolerance training;BADL retraining;occupation/activity based interventions;transfer/mobility retraining (P)   -               User Key  (r) = Recorded By, (t) = Taken By, (c) = Cosigned By      Initials Name Provider Type     Toya Toribio, OT Student OT Student                   Clinical Impression       Row Name 03/31/25 0929          Pain Assessment    Pretreatment Pain Rating 4/10 (P)   -SJ     Posttreatment Pain Rating 4/10 (P)   -SJ     Pain Location head (P)   -SJ     Pain Management Interventions activity modification encouraged (P)   -SJ     Response to Pain Interventions activity participation with tolerable pain (P)   -       Row Name 03/31/25 0929          Plan of Care Review    Plan of Care Reviewed With patient (P)   -SJ     Outcome Evaluation Pt presents near baseline with generalized weakness (L>R), decreased activity tolerance, and decreased ADL participation, warranting IP OT services. Pt uses lift to w/c at baseline. Pt engaged in feeding, provided and educated on AE this date. Return to [facility] at d/c. (P)   -       Row Name 03/31/25 0929          Therapy Assessment/Plan (OT)    Patient/Family Therapy Goal Statement (OT) Return to baseline (P)   -SJ     Rehab Potential (OT) good (P)   -     Criteria for Skilled Therapeutic Interventions Met (OT) yes;skilled treatment is necessary (P)   -     Therapy Frequency (OT) 3 times/wk (P)   -     Predicted Duration of Therapy Intervention (OT) 10 days (P)   -       Row Name 03/31/25 0929          Therapy Plan Review/Discharge Plan (OT)    Anticipated Discharge Disposition (OT) assisted living (P)   -[facility] Name 03/31/25 0929          Vital  Signs    Pre Systolic BP Rehab 168 (P)   -SJ     Pre Treatment Diastolic BP 75 (P)   -SJ     Post Systolic BP Rehab 161 (P)   -SJ     Post Treatment Diastolic BP 76 (P)   -SJ     Pretreatment Heart Rate (beats/min) 78 (P)   -SJ     Posttreatment Heart Rate (beats/min) 75 (P)   -SJ     Pre SpO2 (%) 95 (P)   -SJ     O2 Delivery Pre Treatment nasal cannula (P)   4L  -SJ     Post SpO2 (%) 94 (P)   -SJ     O2 Delivery Post Treatment nasal cannula (P)   -SJ     Pre Patient Position Supine (P)   -SJ     Intra Patient Position Supine (P)   -SJ     Post Patient Position Sitting (P)   -SJ       Row Name 03/31/25 0929          Positioning and Restraints    Pre-Treatment Position in bed (P)   -SJ     Post Treatment Position chair (P)   -SJ     In Chair notified nsg;reclined;sitting;call light within reach;encouraged to call for assist;exit alarm on;RUE elevated;LUE elevated;waffle cushion;on mechanical lift sling;legs elevated;heels elevated (P)   -SJ               User Key  (r) = Recorded By, (t) = Taken By, (c) = Cosigned By      Initials Name Provider Type     Toya Toribio, OT Student OT Student                   Outcome Measures       Row Name 03/31/25 2715          How much help from another is currently needed...    Putting on and taking off regular lower body clothing? 1 (P)   -SJ     Bathing (including washing, rinsing, and drying) 1 (P)   -SJ     Toileting (which includes using toilet bed pan or urinal) 1 (P)   -SJ     Putting on and taking off regular upper body clothing 2 (P)   -SJ     Taking care of personal grooming (such as brushing teeth) 3 (P)   -SJ     Eating meals 3 (P)   -SJ     AM-PAC 6 Clicks Score (OT) 11 (P)   -       Row Name 03/31/25 0957          Modified Dolphin Scale    Pre-Stroke Modified Vida Scale 6 - Unable to determine (UTD) from the medical record documentation (P)   -SJ     Modified [place]olphin Scale 4 - Moderately severe disability.  Unable to walk without assistance, and unable to attend  to own bodily needs without assistance. (P)   -       Row Name 03/31/25 0939          Functional Assessment    Outcome Measure Options AM-PAC 6 Clicks Daily Activity (OT);Modified Vida (P)   -               User Key  (r) = Recorded By, (t) = Taken By, (c) = Cosigned By      Initials Name Provider Type     Toya Toribio, OT Student OT Student                    Occupational Therapy Education       Title: PT OT SLP Therapies (In Progress)       Topic: Occupational Therapy (In Progress)       Point: ADL training (Done)       Learning Progress Summary            Patient Acceptance, TB, VU by  at 3/31/2025 0758                      Point: Precautions (Done)       Learning Progress Summary            Patient Acceptance, TB, VU by  at 3/31/2025 0758                      Point: Body mechanics (Done)       Learning Progress Summary            Patient Acceptance, TB, VU by  at 3/31/2025 0758                                      User Key       Initials Effective Dates Name Provider Type [facility] 02/13/25 -  Toya Toribio, OT Student OT Student OT                  OT Recommendation and Plan  Planned Therapy Interventions (OT): (P) activity tolerance training, BADL retraining, occupation/activity based interventions, transfer/mobility retraining  Therapy Frequency (OT): (P) 3 times/wk  Plan of Care Review  Plan of Care Reviewed With: (P) patient  Outcome Evaluation: (P) Pt presents near baseline with generalized weakness (L>R), decreased activity tolerance, and decreased ADL participation, warranting IP OT services. Pt uses lift to w/c at baseline. Pt engaged in feeding, provided and educated on AE this date. Return to [facility] at d/c.     Time Calculation:   Evaluation Complexity (OT)  Review Occupational Profile/Medical/Therapy History Complexity: (P) expanded/moderate complexity  Assessment, Occupational Performance/Identification of Deficit Complexity: (P) 3-5 performance deficits  Clinical Decision  Making Complexity (OT): (P) detailed assessment/moderate complexity  Overall Complexity of Evaluation (OT): (P) moderate complexity     Time Calculation- OT       Row Name 03/31/25 0941             Time Calculation- OT    OT Start Time 0758 (P)   -      OT Received On 03/31/25 (P)   -      OT Goal Re-Cert Due Date 04/10/25 (P)   -         Timed Charges    82503 - OT Therapeutic Activity Minutes -- (P)   -      18189 - OT Self Care/Mgmt Minutes 10 (P)   -         Untimed Charges    OT Eval/Re-eval Minutes 50 (P)   -         Total Minutes    Timed Charges Total Minutes 10 (P)   -      Untimed Charges Total Minutes 50 (P)   -       Total Minutes 60 (P)   -                User Key  (r) = Recorded By, (t) = Taken By, (c) = Cosigned By      Initials Name Provider Type     Toya Toribio, OT Student OT Student                  Therapy Charges for Today       Code Description Service Date Service Provider Modifiers Qty    46377742333 HC OT THER SUPP EA 15 MIN 3/31/2025 Toya Toribio OT Student GO 1    81428860911 HC OT EVAL MOD COMPLEXITY 4 3/31/2025 Toya Toribio, OT Student GO 1    38592621910 HC OT SELF CARE/MGMT/TRAIN EA 15 MIN 3/31/2025 Toya Toribio OT Student GO 1    00882288457 HC OT THER SUPP EA 15 MIN 3/31/2025 Toya Toribio OT Student GO 1                 SLAVA Bryant  3/31/2025

## 2025-03-31 NOTE — PROGRESS NOTES
"Nutrition Services    Patient Name:  Anoop Morrison  YOB: 1950  MRN: [id number]  Admit Date:  3/29/2025    Consult received \"per nurse admission screen.\" Patient identified to be at nutrition risk per nurse admission screen based on indicator of \"unsure if weight loss\" and \"difficulty chewing/swallowing. Pt denies any significant changes in wt and SLP diet recommendation is regular textures and thin liquids. No nutrition monitoring warranted at this time. RD will continue to follow per protocol.    Electronically signed by:  Rebecca Gallardo RD  03/31/25 16:17 EDT   "

## 2025-04-01 ENCOUNTER — APPOINTMENT (OUTPATIENT)
Dept: OTHER | Facility: HOSPITAL | Age: 75
End: 2025-04-01
Payer: MEDICARE

## 2025-04-01 VITALS
BODY MASS INDEX: 32.95 KG/M2 | HEIGHT: 66 IN | RESPIRATION RATE: 20 BRPM | SYSTOLIC BLOOD PRESSURE: 156 MMHG | OXYGEN SATURATION: 95 % | WEIGHT: 205.03 LBS | DIASTOLIC BLOOD PRESSURE: 91 MMHG | TEMPERATURE: 98.4 F | HEART RATE: 81 BPM

## 2025-04-01 PROBLEM — R41.82 AMS (ALTERED MENTAL STATUS): Status: RESOLVED | Noted: 2025-03-29 | Resolved: 2025-04-01

## 2025-04-01 LAB
ANION GAP SERPL CALCULATED.3IONS-SCNC: 9 MMOL/L (ref 5–15)
BUN SERPL-MCNC: 19 MG/DL (ref 8–23)
BUN/CREAT SERPL: 30.2 (ref 7–25)
CALCIUM SPEC-SCNC: 8.2 MG/DL (ref 8.6–10.5)
CHLORIDE SERPL-SCNC: 99 MMOL/L (ref 98–107)
CO2 SERPL-SCNC: 27 MMOL/L (ref 22–29)
CREAT BLDA-MCNC: 1.2 MG/DL (ref 0.6–1.3)
CREAT SERPL-MCNC: 0.63 MG/DL (ref 0.76–1.27)
DEPRECATED RDW RBC AUTO: 46.2 FL (ref 37–54)
EGFRCR SERPLBLD CKD-EPI 2021: 101.1 ML/MIN/1.73
ERYTHROCYTE [DISTWIDTH] IN BLOOD BY AUTOMATED COUNT: 14.1 % (ref 12.3–15.4)
GLUCOSE BLDC GLUCOMTR-MCNC: 122 MG/DL (ref 70–130)
GLUCOSE BLDC GLUCOMTR-MCNC: 133 MG/DL (ref 70–130)
GLUCOSE BLDC GLUCOMTR-MCNC: 147 MG/DL (ref 70–130)
GLUCOSE BLDC GLUCOMTR-MCNC: 220 MG/DL (ref 70–130)
GLUCOSE BLDC GLUCOMTR-MCNC: 410 MG/DL (ref 70–130)
GLUCOSE SERPL-MCNC: 128 MG/DL (ref 65–99)
HCT VFR BLD AUTO: 33.5 % (ref 37.5–51)
HGB BLD-MCNC: 10.8 G/DL (ref 13–17.7)
MCH RBC QN AUTO: 29 PG (ref 26.6–33)
MCHC RBC AUTO-ENTMCNC: 32.2 G/DL (ref 31.5–35.7)
MCV RBC AUTO: 90.1 FL (ref 79–97)
PLATELET # BLD AUTO: 123 10*3/MM3 (ref 140–450)
PMV BLD AUTO: 10.5 FL (ref 6–12)
POTASSIUM SERPL-SCNC: 4 MMOL/L (ref 3.5–5.2)
RBC # BLD AUTO: 3.72 10*6/MM3 (ref 4.14–5.8)
SODIUM SERPL-SCNC: 135 MMOL/L (ref 136–145)
WBC NRBC COR # BLD AUTO: 4.45 10*3/MM3 (ref 3.4–10.8)

## 2025-04-01 PROCEDURE — 85027 COMPLETE CBC AUTOMATED: CPT | Performed by: HOSPITALIST

## 2025-04-01 PROCEDURE — 94664 DEMO&/EVAL PT USE INHALER: CPT

## 2025-04-01 PROCEDURE — 94799 UNLISTED PULMONARY SVC/PX: CPT

## 2025-04-01 PROCEDURE — 80048 BASIC METABOLIC PNL TOTAL CA: CPT | Performed by: HOSPITALIST

## 2025-04-01 PROCEDURE — 25010000002 HEPARIN (PORCINE) PER 1000 UNITS: Performed by: INTERNAL MEDICINE

## 2025-04-01 PROCEDURE — 94761 N-INVAS EAR/PLS OXIMETRY MLT: CPT

## 2025-04-01 PROCEDURE — 82948 REAGENT STRIP/BLOOD GLUCOSE: CPT

## 2025-04-01 PROCEDURE — 99232 SBSQ HOSP IP/OBS MODERATE 35: CPT

## 2025-04-01 PROCEDURE — 94660 CPAP INITIATION&MGMT: CPT

## 2025-04-01 PROCEDURE — 99239 HOSP IP/OBS DSCHRG MGMT >30: CPT | Performed by: NURSE PRACTITIONER

## 2025-04-01 RX ORDER — DOXYCYCLINE 100 MG/1
100 CAPSULE ORAL EVERY 12 HOURS SCHEDULED
Start: 2025-04-01 | End: 2025-04-04

## 2025-04-01 RX ORDER — INSULIN GLARGINE 100 [IU]/ML
10 INJECTION, SOLUTION SUBCUTANEOUS DAILY
Start: 2025-04-01

## 2025-04-01 RX ORDER — GUAIFENESIN 600 MG/1
600 TABLET, EXTENDED RELEASE ORAL EVERY 12 HOURS SCHEDULED
Start: 2025-04-01 | End: 2025-04-04

## 2025-04-01 RX ORDER — CEFDINIR 300 MG/1
300 CAPSULE ORAL 2 TIMES DAILY
Start: 2025-04-02 | End: 2025-04-05

## 2025-04-01 RX ORDER — CALCIUM CARBONATE 500 MG/1
2 TABLET, CHEWABLE ORAL 3 TIMES DAILY PRN
Status: DISCONTINUED | OUTPATIENT
Start: 2025-04-01 | End: 2025-04-01 | Stop reason: HOSPADM

## 2025-04-01 RX ADMIN — GUAIFENESIN 600 MG: 600 TABLET, EXTENDED RELEASE ORAL at 09:42

## 2025-04-01 RX ADMIN — HEPARIN SODIUM 5000 UNITS: 5000 INJECTION INTRAVENOUS; SUBCUTANEOUS at 06:39

## 2025-04-01 RX ADMIN — ASPIRIN 81 MG CHEWABLE TABLET 81 MG: 81 TABLET CHEWABLE at 09:41

## 2025-04-01 RX ADMIN — BISACODYL 10 MG: 10 SUPPOSITORY RECTAL at 09:42

## 2025-04-01 RX ADMIN — CALCIUM CARBONATE (ANTACID) CHEW TAB 500 MG 2 TABLET: 500 CHEW TAB at 11:06

## 2025-04-01 RX ADMIN — HEPARIN SODIUM 5000 UNITS: 5000 INJECTION INTRAVENOUS; SUBCUTANEOUS at 13:55

## 2025-04-01 RX ADMIN — DOCUSATE SODIUM 50MG AND SENNOSIDES 8.6MG 2 TABLET: 8.6; 5 TABLET, FILM COATED ORAL at 09:42

## 2025-04-01 RX ADMIN — ACETAMINOPHEN 650 MG: 325 TABLET, FILM COATED ORAL at 10:00

## 2025-04-01 RX ADMIN — IPRATROPIUM BROMIDE AND ALBUTEROL SULFATE 3 ML: 2.5; .5 SOLUTION RESPIRATORY (INHALATION) at 07:10

## 2025-04-01 RX ADMIN — POLYETHYLENE GLYCOL 3350 17 G: 17 POWDER, FOR SOLUTION ORAL at 09:42

## 2025-04-01 RX ADMIN — DOXYCYCLINE 100 MG: 100 CAPSULE ORAL at 09:42

## 2025-04-01 RX ADMIN — AMLODIPINE BESYLATE 5 MG: 5 TABLET ORAL at 09:41

## 2025-04-01 RX ADMIN — CARVEDILOL 12.5 MG: 12.5 TABLET, FILM COATED ORAL at 09:42

## 2025-04-01 RX ADMIN — LURASIDONE HYDROCHLORIDE 40 MG: 20 TABLET, FILM COATED ORAL at 09:42

## 2025-04-01 RX ADMIN — CARVEDILOL 12.5 MG: 12.5 TABLET, FILM COATED ORAL at 18:09

## 2025-04-01 NOTE — PROGRESS NOTES
[facility] Neurology    Progress Note    Patient Name: Anoop Morrison  : 1950  MRN: [id number]  Primary Care Physician:  Anjelica Sandoval MD  Date of admission: 3/29/2025    Subjective     Chief Complaint: Altered mental status    History of Present Illness   Patient seen resting comfortably in bed.  Did have some nausea this a.m.  Able to state his name, current year and knew he was at hospital.  Able to do identify today as a referral today.    Review of Systems   General: Negative for fever, nausea, or vomiting.   Neurological: Negative for headache, pain, or weakness.     Objective     Physical Exam  Vitals and nursing note reviewed.   Constitutional:       General: He is not in acute distress.     Appearance: He is not ill-appearing.   Eyes:      Extraocular Movements: Extraocular movements intact.      Pupils: Pupils are equal, round, and reactive to light.      Comments: No nystagmus or deviated gaze noted   Neurological:      Mental Status: He is alert. Mental status is at baseline.      Cranial Nerves: Cranial nerve deficit and dysarthria present. No facial asymmetry.      Sensory: Sensory deficit present.      Motor: Weakness and abnormal muscle tone present. No tremor or seizure activity.      Deep Tendon Reflexes:      Reflex Scores:       Bicep reflexes are 1+ on the right side and 0 on the left side.       Patellar reflexes are 0 on the right side and 0 on the left side.     Comments: Cranial Nerves   CN II: Pupils are equal, round, and reactive to light. Normal visual acuity and visual fields.    CN III IV VI: Extraocular movements are full without nystagmus.  CN V: Normal facial sensation and strength of muscles of mastication.  CN VII: Left-sided facial droop noted  CN VIII:  Auditory acuity is normal.  CN IX & X:  Symmetric palatal movement.  CN XII: The tongue is midline.  No atrophy or fasciculations.     Motor: Abnormal tone of left hand, generalized weakness     Reflexes:  Unequivocal Babinski's               Vitals:   Temp:  [97.9 °F (36.6 °C)-98.5 °F (36.9 °C)] 98.4 °F (36.9 °C)  Heart Rate:  [67-84] 74  Resp:  [16-18] 16  BP: (138-167)/(67-87) 145/70  Flow (L/min) (Oxygen Therapy):  [1-2] 2    Current Medications    Current Facility-Administered Medications:     acetaminophen (TYLENOL) tablet 650 mg, 650 mg, Oral, Q4H PRN, 650 mg at 03/30/25 2300 **OR** acetaminophen (TYLENOL) 160 MG/5ML oral solution 650 mg, 650 mg, Oral, Q4H PRN **OR** acetaminophen (TYLENOL) suppository 650 mg, 650 mg, Rectal, Q4H PRN, Lorne Merlos MD    amLODIPine (NORVASC) tablet 5 mg, 5 mg, Oral, Daily, Bree Yanez MD, 5 mg at 04/01/25 0941    aspirin chewable tablet 81 mg, 81 mg, Oral, Daily, 81 mg at 04/01/25 0941 **OR** aspirin suppository 300 mg, 300 mg, Rectal, Daily, Maggie Bustamante APRN, 300 mg at 03/29/25 2023    atorvastatin (LIPITOR) tablet 80 mg, 80 mg, Oral, Nightly, Maggie Bustamante APRN, 80 mg at 03/31/25 2119    sennosides-docusate (PERICOLACE) 8.6-50 MG per tablet 2 tablet, 2 tablet, Oral, BID PRN, 2 tablet at 04/01/25 0942 **AND** polyethylene glycol (MIRALAX) packet 17 g, 17 g, Oral, Daily PRN, 17 g at 04/01/25 0942 **AND** bisacodyl (DULCOLAX) EC tablet 5 mg, 5 mg, Oral, Daily PRN **AND** bisacodyl (DULCOLAX) suppository 10 mg, 10 mg, Rectal, Daily PRN, Lorne Merlos MD, 10 mg at 04/01/25 0942    carvedilol (COREG) tablet 12.5 mg, 12.5 mg, Oral, BID With Meals, Bree Yanez MD, 12.5 mg at 04/01/25 0942    cefTRIAXone (ROCEPHIN) 2,000 mg in sodium chloride 0.9 % 100 mL MBP, 2,000 mg, Intravenous, Q24H, Lorne Merlos MD, Last Rate: 200 mL/hr at 03/31/25 2120, 2,000 mg at 03/31/25 2120    dextrose (D50W) (25 g/50 mL) IV injection 25 g, 25 g, Intravenous, Q15 Min PRN, Jason Dodd MD    dextrose (GLUTOSE) oral gel 15 g, 15 g, Oral, Q15 Min PRN, Jason Dodd MD    Divalproex Sodium (DEPAKOTE SPRINKLE) capsule 250 mg, 250 mg, Oral, Nightly, Maggie Kessler K, APRN, 250  mg at 03/31/25 2119    doxycycline (MONODOX) capsule 100 mg, 100 mg, Oral, Q12H, Mary Ann Porras, PharmD, 100 mg at 04/01/25 0942    glucagon (GLUCAGEN) injection 1 mg, 1 mg, Intramuscular, Q15 Min PRN, Jason Dodd MD    guaiFENesin (MUCINEX) 12 hr tablet 600 mg, 600 mg, Oral, Q12H, Jason Dodd MD, 600 mg at 04/01/25 0942    heparin (porcine) 5000 UNIT/ML injection 5,000 Units, 5,000 Units, Subcutaneous, Q8H, Lorne Merlos MD, 5,000 Units at 04/01/25 0639    Insulin Lispro (humaLOG) injection 2-7 Units, 2-7 Units, Subcutaneous, 4x Daily AC & at Bedtime, Jason Dodd MD, 2 Units at 03/31/25 2119    ipratropium-albuterol (DUO-NEB) nebulizer solution 3 mL, 3 mL, Nebulization, Q6H While Awake - RT, Jason Dodd MD, 3 mL at 04/01/25 0710    ipratropium-albuterol (DUO-NEB) nebulizer solution 3 mL, 3 mL, Nebulization, Q4H PRN, Jason Dodd MD    Lurasidone HCl (LATUDA) tablet 40 mg, 40 mg, Oral, Daily, Maggie Kessler, APRN, 40 mg at 04/01/25 0942    Pharmacy Meds to Bed Consult, , Not Applicable, Daily, Lorne Merlos MD    risperiDONE (risperDAL) tablet 2 mg, 2 mg, Oral, Nightly, Maggie Kessler, APRN, 2 mg at 03/31/25 2121    sodium chloride 0.9 % flush 10 mL, 10 mL, Intravenous, PRN, Diego Mcconnell MD, 10 mL at 03/29/25 2243    sodium chloride 0.9 % flush 10 mL, 10 mL, Intravenous, Q12H, Lorne Merlos MD, 10 mL at 03/31/25 2121    sodium chloride 0.9 % flush 10 mL, 10 mL, Intravenous, PRN, Lorne Merlos MD    sodium chloride 0.9 % infusion 40 mL, 40 mL, Intravenous, PRN, Lorne Merlos MD    traZODone (DESYREL) tablet 25 mg, 25 mg, Oral, Nightly, Maggie Kessler, APRN, 25 mg at 03/31/25 9998    Laboratory Results:   Lab Results   Component Value Date    GLUCOSE 128 (H) 04/01/2025    CALCIUM 8.2 (L) 04/01/2025     (L) 04/01/2025    K 4.0 04/01/2025    CO2 27.0 04/01/2025    CL 99 04/01/2025    BUN 19 04/01/2025    CREATININE 0.63 (L) 04/01/2025    EGFRIFNONA 51 (L) 02/20/2018     BCR 30.2 (H) 04/01/2025    ANIONGAP 9.0 04/01/2025     Lab Results   Component Value Date    WBC 4.45 04/01/2025    HGB 10.8 (L) 04/01/2025    HCT 33.5 (L) 04/01/2025    MCV 90.1 04/01/2025     (L) 04/01/2025     Lab Results   Component Value Date    CHOL 80 03/30/2025    CHOL 196 02/20/2018    CHOL 191 08/08/2017     Lab Results   Component Value Date    HDL 30 (L) 03/30/2025    HDL 44 02/20/2018    HDL 49 03/08/2016     Lab Results   Component Value Date    LDL 24 03/30/2025    LDL 97 02/20/2018    LDL 83 03/08/2016     Lab Results   Component Value Date    TRIG 156 (H) 03/30/2025    TRIG 343 (H) 02/20/2018    TRIG 219 (H) 03/08/2016     Lab Results   Component Value Date    HGBA1C 5.70 (H) 03/30/2025     Lab Results   Component Value Date    INR 1.14 (H) 03/29/2025    PROTIME 14.7 (H) 03/29/2025     Lab Results   Component Value Date    FOLATE 6.84 03/31/2025     Lab Results   Component Value Date    YARCHQCM15 511 03/31/2025       MRI Brain Without Contrast  Result Date: 3/30/2025  MRI BRAIN WO CONTRAST Date of Exam: 3/30/2025 7:29 PM EDT Indication: Stroke, follow up Left facial droop and dysarthria.  Comparison: CT perfusion March 29, 2025, CT head March 29, 2025 Technique:  Routine multiplanar/multisequence sequence images of the brain were obtained without contrast administration. Findings: There is no restricted diffusion. The lateral and third ventricles seem prominent in size unchanged. There is some minimal periventricular and subcortical white matter changes which while nonspecific could be seen with small vessel ischemic change. There  is some cerebral atrophy. There is no definite orbital abnormality. Pituitary is not enlarged. There is paranasal sinus disease. There also is a right mastoid disease. There is no unusual blooming on susceptibility imaging.     Impression: Impression: 1.There are some minimal white matter changes which while nonspecific could be seen with small vessel ischemic  change. 2.There is some cerebral atrophy. 3.There is some paranasal sinus disease and right mastoid disease. Electronically Signed: Abner Singh MD  3/30/2025 8:00 PM EDT  Workstation ID: [id number]       Assessment / Plan     Active Hospital Problems:    AMS (altered mental status)       Brief Patient Summary:  Anoop Morrison is a 74 y.o. male who has history of hypertension, OA, GERD, previous stroke, T2DM, pulmonary fibrosis, CKD, schizophrenia, anxiety and depression who presented to [facility] ED from [facility] after experiencing nausea and vomiting with diarrhea.  Per patient's sister there has been an outbreak of norovirus at [facility].  She was told by nursing staff that patient developed nausea and vomiting with diarrhea approximately 12 hours prior to presentation.  He also continues to be more confused than usual.  Of note he also had COVID-19 proximately 1 to 2 weeks ago.     He was originally seen by our stroke navigators, with a negative workup.  CT head without acute process.  CTP showed no perfusion deficit.  CTAs of head and neck showed no LVO or flow-limiting stenosis.  MRI brain showed minimal chronic small vessel ischemic changes global cerebral atrophy is noted.  Patient also has right mastoid sinus disease.     Per chart review patient currently takes Depakote, trazodone, Risperdal and lurasidone for schizophrenia, anxiety and depression.     Plan:   Altered mental status/metabolic encephalopathy  Previous CVA  Schizophrenia  Anxiety and depression  Considered EEG however patient back to baseline.  Restart patient's home schizophrenia medication including Depakote 250 mg nightly, risperidone 0.75 mg nightly, trazodone 25 mg nightly and Lurasidone 40 mg daily (per my chart review of patient's fill history)  Continue fall precautions  Patient to continue work with PT/OT  Patient appears to be back to his neurologic baseline.  General neurology will see patient on request.     I have discussed the above with  the patient, bedside RN Dr. Yanez  Time spent with patient: 35 minutes in face-to-face evaluation and management of the patient.      Maggie Kessler, APRN

## 2025-04-01 NOTE — DISCHARGE SUMMARY
[facility] Medicine Services  DISCHARGE SUMMARY    Patient Name: Anoop Morrison  : 1950  MRN: [id number]    Date of Admission: 3/29/2025  6:36 PM  Date of Discharge:  2025  Primary Care Physician: Anjelica Sandoval MD    Consults       Date and Time Order Name Status Description    3/31/2025  9:25 AM Inpatient Neurology Consult General Completed     3/29/2025  6:39 PM Inpatient Neurology Consult Stroke Completed             Hospital Course     Presenting Problem: N/V/D    Active Hospital Problems   No active problems to display.      Resolved Hospital Problems    Diagnosis Date Resolved POA    **AMS (altered mental status) [R41.82] 2025 Yes          Hospital Course:  Anoop Morrison is a 74 y.o. male  with history of hypertension, osteoarthritis, GERD, prior stroke, DM2, pulmonary fibrosis, CKD, and schizophrenia/anxiety/depression who presented from [facility] with nausea, vomiting, diarrhea, and confusion, as well as dysarthria and left-sided facial droop.  Neurostroke team was consulted; however stroke workup was negative and AMS was likely d/t metabolic encephalopathy in setting of acute illness.  Per her sister, there have been an outbreak of norovirus at patient's LTC facility and patient had recent COVID-19 just 1 to 2 weeks ago.  CXR also showed possible infiltrates, possibly from aspiration secondary to nausea and vomiting, and patient was started on empiric Rocephin and doxycycline.  General neurology was consulted for AMS and management of patient's psych medications.       Altered mental status, likely secondary to metabolic encephalopathy  -CT head and MRI brain no acute findings  -Appreciate Neurology evaluation - patient back to baseline at this time  -Resume home meds for psych diagnoses (see below)     Hypernatremia, resolved  Nausea, vomiting, and diarrhea  -Reported Norovirus outbreak at the patient's care facility, pt has been unable to provide stool  sample for testing  -s/p IV fluids with improvement in sodium, Na 135 today     Recent COVID-19 infection  Interstitial lung disease  -Negative COVID-19 antigen, no isolation needed at this time  -CXR and CT neck both show possible infiltrates, consider aspiration secondary to N/V, however these imaging findings may be chronic in nature and may instead relate to the pulmonary fibrosis seen on prior CT chest imaging or to his recent COVID-19 infection  -WBC and procal WNL  -Started on Rocephin and Doxycycline, change Rocephin to Omnicef and continue doxy to complete 5-day course of abx     HTN  -Hypotensive at admission so held home meds initially  -BP improved now, home amlodipine, Coreg resumed, resume home hydralazine at DC, monitor BP     Generalized weakness  -PT/OT     DMII  -HbA1c 5.7%  -SSI while in patient  -Resume home basal insulin at reduced dose as patient has been requiring less insulin in the hospital and want to avoid hypoglycemia. Start at 10 units daily Increase by 2 units every 2 days until patient's morning BG is running < 140. Defer further BG management to facility provider.     Schizophrenia  Anxiety and Depression  -Resume home meds      CKD, unknown baseline  -Baseline has varied, was 1.4 back in 2018 (no other recent labs for comparison)  -Cr 0.95 on admission here, remains stable, 0.63 today      Discharge Follow Up Recommendations for outpatient labs/diagnostics:  --Follow up with facility provider 1-2 days  --Resume home insulin at reduced dose to avoid hypoglycemia, further BG management per facility provider  --Wound care to Right lower extremity until healed: Perform wound care every 3 days. Cleanse open areas with normal saline. apply multi folded Xeroform to the open wounds in the right lateral calf. Cover with small Optifoam dressing     Day of Discharge     HPI:   Patient resting in bed.  Says he is feeling better overall.  Had an episode of vomiting this morning but says he thinks  it was because of his medicines.  Denies nausea currently.  Denies abdominal pain.  Says he wears oxygen regularly.  Is waiting to have a BM.  Plan is return to long-term care facility later this afternoon.    Vital Signs:   Temp:  [97.9 °F (36.6 °C)-98.5 °F (36.9 °C)] 98.4 °F (36.9 °C)  Heart Rate:  [67-84] 81  Resp:  [16-18] 18  BP: (138-167)/(69-89) 164/89  Flow (L/min) (Oxygen Therapy):  [2] 2      Physical Exam:  Constitutional: No acute distress, awake, alert, pleasant  HENT: NCAT, mucous membranes moist  Respiratory: Clear to auscultation bilaterally, respiratory effort normal   Cardiovascular: RRR, no murmurs, rubs, or gallops  Gastrointestinal: Positive bowel sounds, soft, nontender, nondistended  Musculoskeletal: No bilateral ankle edema  Psychiatric: Appropriate affect, cooperative  Neurologic: Oriented x 3, moves all extremities, speech mumbled, intelligible  Skin: No rashes      Pertinent  and/or Most Recent Results     LAB RESULTS:      Lab 04/01/25 0407 03/31/25  1144 03/30/25  0844 03/29/25  2220 03/29/25  1853   WBC 4.45 3.86 3.70  --  6.58   HEMOGLOBIN 10.8* 11.0* 11.0*  --  12.4*   HEMATOCRIT 33.5* 35.5* 36.0*  --  40.1   PLATELETS 123* 118* 108*  --  157   NEUTROS ABS  --   --   --   --  5.94   IMMATURE GRANS (ABS)  --   --   --   --  0.02   LYMPHS ABS  --   --   --   --  0.31*   MONOS ABS  --   --   --   --  0.29   EOS ABS  --   --   --   --  0.00   MCV 90.1 92.9 95.5  --  93.9   PROCALCITONIN  --   --  0.24  --   --    LACTATE  --   --   --  1.3 2.2*   PROTIME  --   --   --   --  14.7*   APTT  --   --   --   --  30.2         Lab 04/01/25 0407 03/31/25  1144 03/30/25 2026 03/30/25  0844 03/30/25  0142 03/29/25  1853   SODIUM 135* 144 146* 151* 147* 150*   POTASSIUM 4.0 4.2  --  4.2  --  4.7   CHLORIDE 99 105  --  112*  --  106   CO2 27.0 31.0*  --  29.0  --  31.0*   ANION GAP 9.0 8.0  --  10.0  --  13.0   BUN 19 20  --  28*  --  36*   CREATININE 0.63* 0.57*  --  0.70*  --  1.20  0.95   EGFR  101.1 104.2  --  97.9  --  85.0   GLUCOSE 128* 140*  --  117*  --  139*   CALCIUM 8.2* 8.4*  --  7.9*  --  8.8   HEMOGLOBIN A1C  --   --   --  5.70*  --   --    TSH  --   --   --  0.932  --   --          Lab 03/31/25  1144 03/29/25  1853   TOTAL PROTEIN 5.8*  --    ALBUMIN 3.4*  --    GLOBULIN 2.4  --    ALT (SGPT) 11 10   AST (SGOT) 14 13   BILIRUBIN 0.3  --    ALK PHOS 50  --          Lab 03/30/25  0844 03/29/25  1853   PROBNP 245.0  --    PROTIME  --  14.7*   INR  --  1.14*         Lab 03/30/25  0844   CHOLESTEROL 80   LDL CHOL 24   HDL CHOL 30*   TRIGLYCERIDES 156*         Lab 03/31/25  1144   FOLATE 6.84   VITAMIN B 12 511         Lab 03/30/25  0416 03/29/25  2300   PH, ARTERIAL 7.352 7.353   PCO2, ARTERIAL 62.0* 63.4*   PO2 .0* 85.2   FIO2 38 28   HCO3 ART 34.3* 35.2*   BASE EXCESS ART 7.0* 7.8*   CARBOXYHEMOGLOBIN 1.3 1.4     Brief Urine Lab Results       None          Microbiology Results (last 10 days)       Procedure Component Value - Date/Time    COVID-19 RAPID AG,VERITOR,COR/PAD/[facility]/BROOKS/LAG/ADELFO/ IN-HOUSE,DRY SWAB, 1 HR TAT - Swab, Nasal Cavity [[id number]]  (Normal) Collected: 03/29/25 2220    Lab Status: Final result Specimen: Swab from Nasal Cavity Updated: 03/29/25 2331     COVID19 Presumptive Negative    Narrative:      Fact sheets for providers: https://www.fda.gov/media/714209/download    Fact sheets for patients: https://www.fda.gov/media/037521/download            XR Chest 1 View  Result Date: 3/31/2025  XR CHEST 1 VW Date of Exam: 3/31/2025 2:53 AM EDT Indication: DYSPNEA, ON EXERTION Comparison: 3/30/2025 Findings: Heart remains enlarged. There is chronic scarring in the upper lobes. Lung volumes appear lower, and there is increased infiltrate or atelectasis in the bases, left greater than right. No pneumothorax identified.     Lower lung volumes with increased infiltrate or atelectasis in the bases, left greater than right. Electronically Signed: Kota Nunez MD  3/31/2025 6:39 AM EDT   Workstation ID: [id number]    MRI Brain Without Contrast  Result Date: 3/30/2025  MRI BRAIN WO CONTRAST Date of Exam: 3/30/2025 7:29 PM EDT Indication: Stroke, follow up Left facial droop and dysarthria.  Comparison: CT perfusion March 29, 2025, CT head March 29, 2025 Technique:  Routine multiplanar/multisequence sequence images of the brain were obtained without contrast administration. Findings: There is no restricted diffusion. The lateral and third ventricles seem prominent in size unchanged. There is some minimal periventricular and subcortical white matter changes which while nonspecific could be seen with small vessel ischemic change. There  is some cerebral atrophy. There is no definite orbital abnormality. Pituitary is not enlarged. There is paranasal sinus disease. There also is a right mastoid disease. There is no unusual blooming on susceptibility imaging.     Impression: 1.There are some minimal white matter changes which while nonspecific could be seen with small vessel ischemic change. 2.There is some cerebral atrophy. 3.There is some paranasal sinus disease and right mastoid disease. Electronically Signed: Abner Singh MD  3/30/2025 8:00 PM EDT  Workstation ID: [id number]    XR Chest 1 View  Result Date: 3/30/2025  XR CHEST 1 VW Date of Exam: 3/30/2025 2:31 AM EDT Indication: possible pneumonia, known pulmonary fibrosis Comparison: 3/29/2025 Findings: Cardiomegaly is unchanged. There is continued scarring in the upper lobes. There is improved aeration at the left lung base. No pneumothorax is seen.     1.Improved aeration at the left lung base. 2.Stable scarring in the upper lobes. Electronically Signed: Kota Nunez MD  3/30/2025 5:31 AM EDT  Workstation ID: [id number]    XR Chest 1 View  Result Date: 3/29/2025  XR CHEST 1 VW Date of Exam: 3/29/2025 7:58 PM EDT Indication: Acute Stroke Protocol (onset < 12 hrs) Comparison: None available. Findings: Heart is enlarged. There is atherosclerotic disease  in the aorta. Pulmonary vascularity is normal. There is some mild scarring/atelectasis/infiltrate in the lower left lung. There is also probably some scarring in the left upper lobe and right upper lobe. No pneumothorax. Consider upright PA and lateral chest x-ray when clinically feasible.     1.Cardiomegaly. 2.Areas of scarring/atelectasis/infiltrate in the lower left lung. 3.Probable scarring in the upper lobes. Electronically Signed: Kota Nunez MD  3/29/2025 8:30 PM EDT  Workstation ID: [id number]    CT Angiogram Head w AI Analysis of LVO  Result Date: 3/29/2025  CT ANGIOGRAM HEAD W AI ANALYSIS OF LVO, CT ANGIOGRAM NECK Date of Exam: 3/29/2025 6:46 PM EDT Indication: Neuro Deficit, acute, Stroke suspected Neuro deficit, acute stroke suspected. Comparison: None available. Technique: CTA of the head and neck was performed after the uneventful intravenous administration of 125 cc Isovue-370. Reconstructed coronal and sagittal images were also obtained. In addition, a 3-D volume rendered image was created for interpretation.  Automated exposure control and iterative reconstruction methods were used. Findings: CTA NECK: *Aortic arch: No aneurysm. No significant stenosis or occlusion of the major arch vessels. *Left carotid system: No aneurysm, significant stenosis or occlusion. *Right carotid system: No aneurysm, significant stenosis or occlusion. *Vertebrobasilar system: The vertebral arteries arise from their respective subclavian arteries. No aneurysm, significant stenosis or occlusion. *Mosaic attenuation of the lung apices. Correlate for pulmonary infectious or inflammatory process. CTA HEAD: *Anterior circulation: No aneurysm, significant stenosis or occlusion. 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 *Posterior circulation: No aneurysm, significant stenosis or occlusion. *Anatomic  variants: None of significance. *Venous sinuses: Patent.     1.No hemodynamically significant stenosis, large vessel cut off or aneurysms in the intracranial circulation 2.No hemodynamically significant stenosis, dissection or aneurysms in the extracranial circulation. 3.Mosaic attenuation of the upper lungs which may reflect underlying infectious or inflammatory process. Electronically Signed: Diego Martinez MD  3/29/2025 7:38 PM EDT  Workstation ID: [id number]    CT Angiogram Neck  Result Date: 3/29/2025  CT ANGIOGRAM HEAD W AI ANALYSIS OF LVO, CT ANGIOGRAM NECK Date of Exam: 3/29/2025 6:46 PM EDT Indication: Neuro Deficit, acute, Stroke suspected Neuro deficit, acute stroke suspected. Comparison: None available. Technique: CTA of the head and neck was performed after the uneventful intravenous administration of 125 cc Isovue-370. Reconstructed coronal and sagittal images were also obtained. In addition, a 3-D volume rendered image was created for interpretation.  Automated exposure control and iterative reconstruction methods were used. Findings: CTA NECK: *Aortic arch: No aneurysm. No significant stenosis or occlusion of the major arch vessels. *Left carotid system: No aneurysm, significant stenosis or occlusion. *Right carotid system: No aneurysm, significant stenosis or occlusion. *Vertebrobasilar system: The vertebral arteries arise from their respective subclavian arteries. No aneurysm, significant stenosis or occlusion. *Mosaic attenuation of the lung apices. Correlate for pulmonary infectious or inflammatory process. CTA HEAD: *Anterior circulation: No aneurysm, significant stenosis or occlusion. 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 *Posterior circulation: No aneurysm, significant stenosis or occlusion. *Anatomic variants: None of significance. *Venous sinuses: Patent.     1.No  hemodynamically significant stenosis, large vessel cut off or aneurysms in the intracranial circulation 2.No hemodynamically significant stenosis, dissection or aneurysms in the extracranial circulation. 3.Mosaic attenuation of the upper lungs which may reflect underlying infectious or inflammatory process. Electronically Signed: Diego Martinez MD  3/29/2025 7:38 PM EDT  Workstation ID: [id number]    CT CEREBRAL PERFUSION WITH & WITHOUT CONTRAST  Result Date: 3/29/2025  CT CEREBRAL PERFUSION W WO CONTRAST Date of Exam: 3/29/2025 6:46 PM EDT Indication: Neuro Deficit, acute, Stroke suspected Neuro deficit, acute stroke suspected.  Comparison: None available. Technique: Axial CT images of the brain were obtained prior to and after the administration of 125 cc Isovue-370. Core blood volume, core blood flow, mean transit time, and Tmax images were obtained utilizing the Rapid software protocol. A limited CT angiogram of the head was also performed to measure the blood vessel density. The radiation dose reduction device was turned on for each scan per the ALARA (As Low as Reasonably Achievable) protocol. Findings:  Cerebral blood flow, blood volume, and mean transit time maps are symmetric without large perfusion defect.  CBF<30% volume: 0mL Tmax>6sec volume: 0 mL Mismatch volume: 0mL Mismatch ratio: None        1. No evidence of core infarct nor ischemic brain at risk. Electronically Signed: Diego Martinez MD  3/29/2025 7:35 PM EDT  Workstation ID: [id number]    CT Abdomen Pelvis With Contrast  Result Date: 3/29/2025  CT ABDOMEN PELVIS W CONTRAST Date of Exam: 3/29/2025 6:51 PM EDT Indication: Vomiting diarrhea, abdominal pain. Comparison: None available. Technique: Axial CT images were obtained of the abdomen and pelvis following the uneventful intravenous administration of 125 cc Isovue-370. Reconstructed coronal and sagittal images were also obtained. Automated exposure control and iterative construction methods were used.  Findings: LUNG BASES: Minimal basal atelectasis/parenchymal scarring. LIVER: Few scattered subcentimeter hypodensities likely representing cysts although too small to adequately characterize. BILIARY/GALLBLADDER:  Unremarkable SPLEEN:  Unremarkable PANCREAS:  Unremarkable ADRENAL:  Unremarkable KIDNEYS:  Unremarkable parenchyma with no solid mass identified. No obstruction.  No calculus identified. GASTROINTESTINAL/MESENTERY:  No evidence of obstruction nor inflammation.  The appendix is normal. MESENTERIC VESSELS:  Patent. AORTA/IVC:  Normal caliber. RETROPERITONEUM/LYMPH NODES:  Unremarkable REPRODUCTIVE:  Unremarkable BLADDER: There is mild nonspecific urinary bladder mural thickening. Correlate for signs of cystitis. OSSEUS STRUCTURES: No acute osseous process identified. Chronic posttraumatic changes involving the right femoral neck related to previous fracture. Potential old left femoral neck fracture as well. There is generalized osteopenia and degenerative change  throughout the lumbar spine. Absence versus resection of the distal coccyx. Correlate with history.     Impression: 1.Mild nonspecific urinary bladder mural thickening. Correlate for signs of cystitis. 2.No acute inflammatory process identified in the abdomen or pelvis. 3.Chronic changes as above. Electronically Signed: Diego Martinez MD  3/29/2025 7:26 PM EDT  Workstation ID: [id number]    CT Head Without Contrast Stroke Protocol  Result Date: 3/29/2025  CT HEAD WO CONTRAST STROKE PROTOCOL Date of Exam: 3/29/2025 6:38 PM EDT Indication: Neuro deficit, acute, stroke suspected Neuro Deficit, acute, Stroke suspected. Comparison: None available. Technique: Axial CT images were obtained of the head without contrast administration.  Reconstructed coronal images were also obtained. Automated exposure control and iterative construction methods were used. Scan Time: 6:42 p.m. Results discussed with stroke navigator at 6:48 p.m. Findings: There is no evidence  of acute territorial infarction. There is no acute intracranial hemorrhage. There are no extra-axial collections. Ventricles and CSF spaces are symmetric. No mass effect nor hydrocephalus. Brain parenchyma appears normal for age.  There is scattered mucosal thickening within the inferior recesses of the maxillary sinuses.  Osseous structures and orbits appear intact.     Impression: No acute intracranial findings. Electronically Signed: Diego Martinez MD  3/29/2025 6:48 PM EDT  Workstation ID: [id number]              Results for orders placed during the hospital encounter of 03/29/25    Adult Transthoracic Echo Complete W/ Cont if Necessary Per Protocol (With Agitated Saline)    Interpretation Summary    Left ventricular systolic function is normal. Calculated left ventricular EF = 61.8% Normal left ventricular cavity size and wall thickness noted. All left ventricular wall segments contract normally. Normal left atrial pressure.    Normal right ventricular cavity size and systolic function noted.    Saline test for shunting not performed. Patient study was not clear enough to add the bubble study.    The aortic valve exhibits sclerosis without stenosis. The aortic valve appears trileaflet. No aortic valve regurgitation is present.    No mitral valve regurgitation or significant stenosis is present. Mild mitral annular calcification is present.    The tricuspid valve is structurally normal with no significant regurgitation or significant stenosis present.      Plan for Follow-up of Pending Labs/Results:     Discharge Details        Discharge Medications        New Medications        Instructions Start Date   cefdinir 300 MG capsule  Commonly known as: OMNICEF   300 mg, Oral, 2 Times Daily   Start Date: April 2, 2025     doxycycline 100 MG capsule  Commonly known as: MONODOX   100 mg, Oral, Every 12 Hours Scheduled      guaiFENesin 600 MG 12 hr tablet  Commonly known as: MUCINEX   600 mg, Oral, Every 12 Hours  Scheduled             Changes to Medications        Instructions Start Date   BASAGLAR KWIKPEN 100 UNIT/ML injection pen  What changed:   how much to take  when to take this  additional instructions   10 Units, Subcutaneous, Daily, Start at 10 units daily Increase by 2 units every 2 days until patient's morning BG is running < 140. Defer further BG management to facility provider. Patient previously on 20 units two times daily.             Continue These Medications        Instructions Start Date   acetaminophen 500 MG tablet  Commonly known as: TYLENOL   1,000 mg, 3 Times Daily PRN      amLODIPine 5 MG tablet  Commonly known as: NORVASC   5 mg, Oral, Daily      carvedilol 12.5 MG tablet  Commonly known as: COREG   12.5 mg, 2 Times Daily With Meals      cetirizine 10 MG tablet  Commonly known as: zyrTEC   10 mg, Daily      Diclofenac Sodium 1 % gel gel  Commonly known as: VOLTAREN   4 g, Topical, As Needed      Divalproex Sodium 125 MG capsule  Commonly known as: DEPAKOTE SPRINKLE   250 mg, Nightly      Dulcolax 10 MG suppository  Generic drug: bisacodyl   10 mg, Rectal, As Needed      ferrous sulfate 325 (65 FE) MG tablet   325 mg, Daily With Breakfast      fluticasone 50 MCG/ACT nasal spray  Commonly known as: FLONASE   2 sprays, Nasal, Daily      hydrALAZINE 50 MG tablet  Commonly known as: APRESOLINE   50 mg, 2 Times Daily      icosapent ethyl 1 g capsule capsule  Commonly known as: VASCEPA   1 g, 2 Times Daily With Meals      ipratropium-albuterol 0.5-2.5 mg/3 ml nebulizer  Commonly known as: DUO-NEB   3 mL, Every 4 Hours PRN      lurasidone 40 MG tablet tablet  Commonly known as: LATUDA   40 mg, Daily      naphazoline-pheniramine 0.025-0.3 % ophthalmic solution  Commonly known as: NAPHCON-A   2 drops, Both Eyes, 4 Times Daily PRN      ondansetron ODT 4 MG disintegrating tablet  Commonly known as: ZOFRAN-ODT   4 mg, Every 8 Hours PRN      pantoprazole 40 MG EC tablet  Commonly known as: PROTONIX   40 mg, Oral,  Daily      polyethylene glycol 17 GM/SCOOP powder  Commonly known as: MIRALAX   17 g, Oral, Daily PRN      risperiDONE 0.25 MG tablet  Commonly known as: risperDAL   0.25 mg, 3 Times Daily PRN      risperiDONE 2 MG tablet  Commonly known as: risperDAL   2 mg, Every 12 Hours Scheduled      rosuvastatin 10 MG tablet  Commonly known as: CRESTOR   10 mg, Nightly      Selsun Blue Itchy Dry Scalp 1 % shampoo  Generic drug: pyrithione zinc   1 Application, Topical, Daily PRN      traZODone 50 MG tablet  Commonly known as: DESYREL   25 mg, Nightly      Vitamin D3 50 MCG (2000 UT) capsule   2,000 Units, Oral, Daily             Stop These Medications      senna 8.6 MG tablet  Commonly known as: SENOKOT              Allergies   Allergen Reactions    Other          Discharge Disposition:  Long Term Care (DC - External)    Diet:  Hospital:  Diet Order   Procedures    Diet: Cardiac, Diabetic; Healthy Heart (2-3 Na+); Consistent Carbohydrate; Texture: Regular (IDDSI 7); Fluid Consistency: Thin (IDDSI 0)       Diet Instructions       Diet: Cardiac Diets, Diabetic Diets; Healthy Heart (2-3 Na+); Regular (IDDSI 7); Thin (IDDSI 0); Consistent Carbohydrate      Discharge Diet:  Cardiac Diets  Diabetic Diets       Cardiac Diet: Healthy Heart (2-3 Na+)    Texture: Regular (IDDSI 7)    Fluid Consistency: Thin (IDDSI 0)    Diabetic Diet: Consistent Carbohydrate             Activity:  Activity Instructions       Activity as Tolerated              Restrictions or Other Recommendations:         CODE STATUS:    Code Status and Medical Interventions: CPR (Attempt to Resuscitate); Full Support   Ordered at: 03/29/25 2200     Code Status (Patient has no pulse and is not breathing):    CPR (Attempt to Resuscitate)     Medical Interventions (Patient has pulse or is breathing):    Full Support     Level Of Support Discussed With:    Next of Kin (If No Surrogate)       Health Care Surrogate       Future Appointments   Date Time Provider Department  Center   4/1/2025  7:30 PM MED 11 [facility] None   4/10/2025  8:00 AM [facility]  [facility]       Additional Instructions for the Follow-ups that You Need to Schedule       Discharge Follow-up with Specified Provider: Follow up [facility] facility provider 1-2 days   As directed      To: Follow up [facility] facility provider 1-2 days                    Qi Black, PIERRE  04/01/25      Time Spent on Discharge:  I spent  40  minutes on this discharge activity which included: face-to-face encounter with the patient, reviewing the data in the system, coordination of the care with the nursing staff as well as consultants, documentation, and entering orders.

## 2025-04-01 NOTE — NURSING NOTE
WOC follow-up for assessment of the patient's bilateral gluteals.    No skin breakdown noted at sacrum.  There appears to be an old injury that is healed at the coccyx.  Allevyn dressing reinforced.    Patient turned well with RN present.    Continue pressure injury prevention measures.    Patient is on a waffle overlay mattress.    WOC will sign off.    Mamadou Scott RN, BSN, CWOCN  Wound, Ostomy and Continence (WOC) Department  [facility]

## 2025-04-01 NOTE — CONSULTS
"Not a candidate for the stroke/diabetes telehealth class as \"CT head and MRI brain no acute findings \" per recent notes.   "

## 2025-04-01 NOTE — CASE MANAGEMENT/SOCIAL WORK
Case Management Discharge Note      Final Note: MSW updated by physician and APRN that pt is ready fro discharge back to [facility]. MSW called and updated Lydia with admissions at [facility]. Pt able to return today. MSW changed the pharmacy to the facility's pharmacy of [facility] in [place]. MSW requested ambulance transportation and pt was scheduled for EMS transportation at 1930 today. MSW updated pt's sister, who remains agreeable to plan and IMM delivered. MSW updated [facility] staff on mabulance time today as well as Lydia with [facility]. PCS form uploaded electronically. MSW faxed discharge summary to [facility]. Number to call report is [phone].    Provided Post Acute Provider List?: N/A  N/A Provider List Comment: Already a longterm care resident at [facility]  Provided Post Acute Provider Quality & Resource List?: N/A    Selected Continued Care - Admitted Since 3/29/2025       Destination Coordination complete.      Service Provider Services Address Phone Fax Patient Preferred    [facility] [address], [place] [id number] [phone] --              Durable Medical Equipment    No services have been selected for the patient.                Dialysis/Infusion    No services have been selected for the patient.                Home Medical Care    No services have been selected for the patient.                Therapy    No services have been selected for the patient.                Community Resources    No services have been selected for the patient.                Community & DME    No services have been selected for the patient.                         Final Discharge Disposition Code: 03 - skilled nursing facility (SNF)

## 2025-04-01 NOTE — DISCHARGE PLACEMENT REQUEST
"Sharath Morrison (74 y.o. Male)       Date of Birth   1950    Social Security Number       Address   [address] [facility] [address] [address]    Home Phone   [phone]    MRN   [id number]       Quaker   Orthodox    Marital Status   Single                            Admission Date   3/29/2025    Admission Type   Emergency    Admitting Provider   Bree Yanez MD    Attending Provider   Bree Yanez MD    Department, Room/Bed   [facility] 3E, S349/1       Discharge Date       Discharge Disposition   Long Term Care (DC - External)    Discharge Destination                                 Attending Provider: Bree Yanez MD    Allergies: Other    Isolation: Spore   Infection: Norovirus (03/31/25)   Code Status: CPR    Ht: 167.6 cm (65.98\")   Wt: 93 kg (205 lb 0.4 oz)    Admission Cmt: None   Principal Problem: AMS (altered mental status) [R41.82]                   Active Insurance as of 3/29/2025       Primary Coverage       Payor Plan Insurance Group Employer/Plan Group    MEDICARE MEDICARE A & B        Payor Plan Address Payor Plan Phone Number Payor Plan Fax Number Effective Dates    [address] [phone]  9/1/2009 - None Entered    [facility] [id number]         Subscriber Name Subscriber Birth Date Member ID       SHARATH MORRISON 1950 [id number]               Secondary Coverage       Payor Plan Insurance Group Employer/Plan Group    [place]        Payor Plan Address Payor Plan Phone Number Payor Plan Fax Number Effective Dates    [address] [phone]  1/1/2017 - None Entered    [place] [address]         Subscriber Name Subscriber Birth Date Member ID       SHARATH MORRISON 1950 [id number]                     Emergency Contacts        (Rel.) Home Phone Work Phone Mobile Phone    Sonali Ortiz (Sister) [phone] -- [phone]                 Discharge Summary        Qi Black APRN at 04/01/25 1329        "       [facility] Medicine Services  DISCHARGE SUMMARY    Patient Name: Anoop Morrison  : 1950  MRN: [id number]    Date of Admission: 3/29/2025  6:36 PM  Date of Discharge:  2025  Primary Care Physician: Anjelica Sandoval MD    Consults       Date and Time Order Name Status Description    3/31/2025  9:25 AM Inpatient Neurology Consult General Completed     3/29/2025  6:39 PM Inpatient Neurology Consult Stroke Completed             Hospital Course     Presenting Problem: N/V/D    Active Hospital Problems   No active problems to display.      Resolved Hospital Problems    Diagnosis Date Resolved POA    **AMS (altered mental status) [R41.82] 2025 Yes          Hospital Course:  Anoop Morrison is a 74 y.o. male  with history of hypertension, osteoarthritis, GERD, prior stroke, DM2, pulmonary fibrosis, CKD, and schizophrenia/anxiety/depression who presented from [facility] with nausea, vomiting, diarrhea, and confusion, as well as dysarthria and left-sided facial droop.  Neurostroke team was consulted; however stroke workup was negative and AMS was likely d/t metabolic encephalopathy in setting of acute illness.  Per her sister, there have been an outbreak of norovirus at patient's LTC facility and patient had recent COVID-19 just 1 to 2 weeks ago.  CXR also showed possible infiltrates, possibly from aspiration secondary to nausea and vomiting, and patient was started on empiric Rocephin and doxycycline.  General neurology was consulted for AMS and management of patient's psych medications.       Altered mental status, likely secondary to metabolic encephalopathy  -CT head and MRI brain no acute findings  -Appreciate Neurology evaluation - patient back to baseline at this time  -Resume home meds for psych diagnoses (see below)     Hypernatremia, resolved  Nausea, vomiting, and diarrhea  -Reported Norovirus outbreak at the patient's care facility, pt has been unable to provide stool  sample for testing  -s/p IV fluids with improvement in sodium, Na 135 today     Recent COVID-19 infection  Interstitial lung disease  -Negative COVID-19 antigen, no isolation needed at this time  -CXR and CT neck both show possible infiltrates, consider aspiration secondary to N/V, however these imaging findings may be chronic in nature and may instead relate to the pulmonary fibrosis seen on prior CT chest imaging or to his recent COVID-19 infection  -WBC and procal WNL  -Started on Rocephin and Doxycycline, change Rocephin to Omnicef and continue doxy to complete 5-day course of abx     HTN  -Hypotensive at admission so held home meds initially  -BP improved now, home amlodipine, Coreg resumed, resume home hydralazine at DC, monitor BP     Generalized weakness  -PT/OT     DMII  -HbA1c 5.7%  -SSI while in patient  -Resume home basal insulin at reduced dose as patient has been requiring less insulin in the hospital and want to avoid hypoglycemia. Start at 10 units daily Increase by 2 units every 2 days until patient's morning BG is running < 140. Defer further BG management to facility provider.     Schizophrenia  Anxiety and Depression  -Resume home meds      CKD, unknown baseline  -Baseline has varied, was 1.4 back in 2018 (no other recent labs for comparison)  -Cr 0.95 on admission here, remains stable, 0.63 today      Discharge Follow Up Recommendations for outpatient labs/diagnostics:  --Follow up with facility provider 1-2 days  --Resume home insulin at reduced dose to avoid hypoglycemia, further BG management per facility provider  --Wound care to Right lower extremity until healed: Perform wound care every 3 days. Cleanse open areas with normal saline. apply multi folded Xeroform to the open wounds in the right lateral calf. Cover with small Optifoam dressing     Day of Discharge     HPI:   Patient resting in bed.  Says he is feeling better overall.  Had an episode of vomiting this morning but says he thinks  it was because of his medicines.  Denies nausea currently.  Denies abdominal pain.  Says he wears oxygen regularly.  Is waiting to have a BM.  Plan is return to long-term care facility later this afternoon.    Vital Signs:   Temp:  [97.9 °F (36.6 °C)-98.5 °F (36.9 °C)] 98.4 °F (36.9 °C)  Heart Rate:  [67-84] 81  Resp:  [16-18] 18  BP: (138-167)/(69-89) 164/89  Flow (L/min) (Oxygen Therapy):  [2] 2      Physical Exam:  Constitutional: No acute distress, awake, alert, pleasant  HENT: NCAT, mucous membranes moist  Respiratory: Clear to auscultation bilaterally, respiratory effort normal   Cardiovascular: RRR, no murmurs, rubs, or gallops  Gastrointestinal: Positive bowel sounds, soft, nontender, nondistended  Musculoskeletal: No bilateral ankle edema  Psychiatric: Appropriate affect, cooperative  Neurologic: Oriented x 3, moves all extremities, speech mumbled, intelligible  Skin: No rashes      Pertinent  and/or Most Recent Results     LAB RESULTS:      Lab 04/01/25 0407 03/31/25  1144 03/30/25  0844 03/29/25  2220 03/29/25  1853   WBC 4.45 3.86 3.70  --  6.58   HEMOGLOBIN 10.8* 11.0* 11.0*  --  12.4*   HEMATOCRIT 33.5* 35.5* 36.0*  --  40.1   PLATELETS 123* 118* 108*  --  157   NEUTROS ABS  --   --   --   --  5.94   IMMATURE GRANS (ABS)  --   --   --   --  0.02   LYMPHS ABS  --   --   --   --  0.31*   MONOS ABS  --   --   --   --  0.29   EOS ABS  --   --   --   --  0.00   MCV 90.1 92.9 95.5  --  93.9   PROCALCITONIN  --   --  0.24  --   --    LACTATE  --   --   --  1.3 2.2*   PROTIME  --   --   --   --  14.7*   APTT  --   --   --   --  30.2         Lab 04/01/25 0407 03/31/25  1144 03/30/25 2026 03/30/25  0844 03/30/25  0142 03/29/25  1853   SODIUM 135* 144 146* 151* 147* 150*   POTASSIUM 4.0 4.2  --  4.2  --  4.7   CHLORIDE 99 105  --  112*  --  106   CO2 27.0 31.0*  --  29.0  --  31.0*   ANION GAP 9.0 8.0  --  10.0  --  13.0   BUN 19 20  --  28*  --  36*   CREATININE 0.63* 0.57*  --  0.70*  --  1.20  0.95   EGFR  101.1 104.2  --  97.9  --  85.0   GLUCOSE 128* 140*  --  117*  --  139*   CALCIUM 8.2* 8.4*  --  7.9*  --  8.8   HEMOGLOBIN A1C  --   --   --  5.70*  --   --    TSH  --   --   --  0.932  --   --          Lab 03/31/25  1144 03/29/25  1853   TOTAL PROTEIN 5.8*  --    ALBUMIN 3.4*  --    GLOBULIN 2.4  --    ALT (SGPT) 11 10   AST (SGOT) 14 13   BILIRUBIN 0.3  --    ALK PHOS 50  --          Lab 03/30/25  0844 03/29/25  1853   PROBNP 245.0  --    PROTIME  --  14.7*   INR  --  1.14*         Lab 03/30/25  0844   CHOLESTEROL 80   LDL CHOL 24   HDL CHOL 30*   TRIGLYCERIDES 156*         Lab 03/31/25  1144   FOLATE 6.84   VITAMIN B 12 511         Lab 03/30/25  0416 03/29/25  2300   PH, ARTERIAL 7.352 7.353   PCO2, ARTERIAL 62.0* 63.4*   PO2 .0* 85.2   FIO2 38 28   HCO3 ART 34.3* 35.2*   BASE EXCESS ART 7.0* 7.8*   CARBOXYHEMOGLOBIN 1.3 1.4     Brief Urine Lab Results       None          Microbiology Results (last 10 days)       Procedure Component Value - Date/Time    COVID-19 RAPID AG,VERITOR,COR/PAD/ILDEFONSO/BROOKS/LAG/ADELFO/ IN-HOUSE,DRY SWAB, 1 HR TAT - Swab, Nasal Cavity [[id number]]  (Normal) Collected: 03/29/25 2220    Lab Status: Final result Specimen: Swab from Nasal Cavity Updated: 03/29/25 2331     COVID19 Presumptive Negative    Narrative:      Fact sheets for providers: https://www.fda.gov/media/401759/download    Fact sheets for patients: https://www.fda.gov/media/277486/download            XR Chest 1 View  Result Date: 3/31/2025  XR CHEST 1 VW Date of Exam: 3/31/2025 2:53 AM EDT Indication: DYSPNEA, ON EXERTION Comparison: 3/30/2025 Findings: Heart remains enlarged. There is chronic scarring in the upper lobes. Lung volumes appear lower, and there is increased infiltrate or atelectasis in the bases, left greater than right. No pneumothorax identified.     Lower lung volumes with increased infiltrate or atelectasis in the bases, left greater than right. Electronically Signed: Kota Nunez MD  3/31/2025 6:39 AM EDT   Workstation ID: [id number]    MRI Brain Without Contrast  Result Date: 3/30/2025  MRI BRAIN WO CONTRAST Date of Exam: 3/30/2025 7:29 PM EDT Indication: Stroke, follow up Left facial droop and dysarthria.  Comparison: CT perfusion March 29, 2025, CT head March 29, 2025 Technique:  Routine multiplanar/multisequence sequence images of the brain were obtained without contrast administration. Findings: There is no restricted diffusion. The lateral and third ventricles seem prominent in size unchanged. There is some minimal periventricular and subcortical white matter changes which while nonspecific could be seen with small vessel ischemic change. There  is some cerebral atrophy. There is no definite orbital abnormality. Pituitary is not enlarged. There is paranasal sinus disease. There also is a right mastoid disease. There is no unusual blooming on susceptibility imaging.     Impression: 1.There are some minimal white matter changes which while nonspecific could be seen with small vessel ischemic change. 2.There is some cerebral atrophy. 3.There is some paranasal sinus disease and right mastoid disease. Electronically Signed: Abner Singh MD  3/30/2025 8:00 PM EDT  Workstation ID: [id number]    XR Chest 1 View  Result Date: 3/30/2025  XR CHEST 1 VW Date of Exam: 3/30/2025 2:31 AM EDT Indication: possible pneumonia, known pulmonary fibrosis Comparison: 3/29/2025 Findings: Cardiomegaly is unchanged. There is continued scarring in the upper lobes. There is improved aeration at the left lung base. No pneumothorax is seen.     1.Improved aeration at the left lung base. 2.Stable scarring in the upper lobes. Electronically Signed: Kota Nunez MD  3/30/2025 5:31 AM EDT  Workstation ID: [id number]    XR Chest 1 View  Result Date: 3/29/2025  XR CHEST 1 VW Date of Exam: 3/29/2025 7:58 PM EDT Indication: Acute Stroke Protocol (onset < 12 hrs) Comparison: None available. Findings: Heart is enlarged. There is atherosclerotic disease  in the aorta. Pulmonary vascularity is normal. There is some mild scarring/atelectasis/infiltrate in the lower left lung. There is also probably some scarring in the left upper lobe and right upper lobe. No pneumothorax. Consider upright PA and lateral chest x-ray when clinically feasible.     1.Cardiomegaly. 2.Areas of scarring/atelectasis/infiltrate in the lower left lung. 3.Probable scarring in the upper lobes. Electronically Signed: Kota Nunez MD  3/29/2025 8:30 PM EDT  Workstation ID: [id number]    CT Angiogram Head w AI Analysis of LVO  Result Date: 3/29/2025  CT ANGIOGRAM HEAD W AI ANALYSIS OF LVO, CT ANGIOGRAM NECK Date of Exam: 3/29/2025 6:46 PM EDT Indication: Neuro Deficit, acute, Stroke suspected Neuro deficit, acute stroke suspected. Comparison: None available. Technique: CTA of the head and neck was performed after the uneventful intravenous administration of 125 cc Isovue-370. Reconstructed coronal and sagittal images were also obtained. In addition, a 3-D volume rendered image was created for interpretation.  Automated exposure control and iterative reconstruction methods were used. Findings: CTA NECK: *Aortic arch: No aneurysm. No significant stenosis or occlusion of the major arch vessels. *Left carotid system: No aneurysm, significant stenosis or occlusion. *Right carotid system: No aneurysm, significant stenosis or occlusion. *Vertebrobasilar system: The vertebral arteries arise from their respective subclavian arteries. No aneurysm, significant stenosis or occlusion. *Mosaic attenuation of the lung apices. Correlate for pulmonary infectious or inflammatory process. CTA HEAD: *Anterior circulation: No aneurysm, significant stenosis or occlusion. 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 *Posterior circulation: No aneurysm, significant stenosis or occlusion. *Anatomic  variants: None of significance. *Venous sinuses: Patent.     1.No hemodynamically significant stenosis, large vessel cut off or aneurysms in the intracranial circulation 2.No hemodynamically significant stenosis, dissection or aneurysms in the extracranial circulation. 3.Mosaic attenuation of the upper lungs which may reflect underlying infectious or inflammatory process. Electronically Signed: Diego Martinez MD  3/29/2025 7:38 PM EDT  Workstation ID: [id number]    CT Angiogram Neck  Result Date: 3/29/2025  CT ANGIOGRAM HEAD W AI ANALYSIS OF LVO, CT ANGIOGRAM NECK Date of Exam: 3/29/2025 6:46 PM EDT Indication: Neuro Deficit, acute, Stroke suspected Neuro deficit, acute stroke suspected. Comparison: None available. Technique: CTA of the head and neck was performed after the uneventful intravenous administration of 125 cc Isovue-370. Reconstructed coronal and sagittal images were also obtained. In addition, a 3-D volume rendered image was created for interpretation.  Automated exposure control and iterative reconstruction methods were used. Findings: CTA NECK: *Aortic arch: No aneurysm. No significant stenosis or occlusion of the major arch vessels. *Left carotid system: No aneurysm, significant stenosis or occlusion. *Right carotid system: No aneurysm, significant stenosis or occlusion. *Vertebrobasilar system: The vertebral arteries arise from their respective subclavian arteries. No aneurysm, significant stenosis or occlusion. *Mosaic attenuation of the lung apices. Correlate for pulmonary infectious or inflammatory process. CTA HEAD: *Anterior circulation: No aneurysm, significant stenosis or occlusion. 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 *Posterior circulation: No aneurysm, significant stenosis or occlusion. *Anatomic variants: None of significance. *Venous sinuses: Patent.     1.No  hemodynamically significant stenosis, large vessel cut off or aneurysms in the intracranial circulation 2.No hemodynamically significant stenosis, dissection or aneurysms in the extracranial circulation. 3.Mosaic attenuation of the upper lungs which may reflect underlying infectious or inflammatory process. Electronically Signed: Diego Martinez MD  3/29/2025 7:38 PM EDT  Workstation ID: [id number]    CT CEREBRAL PERFUSION WITH & WITHOUT CONTRAST  Result Date: 3/29/2025  CT CEREBRAL PERFUSION W WO CONTRAST Date of Exam: 3/29/2025 6:46 PM EDT Indication: Neuro Deficit, acute, Stroke suspected Neuro deficit, acute stroke suspected.  Comparison: None available. Technique: Axial CT images of the brain were obtained prior to and after the administration of 125 cc Isovue-370. Core blood volume, core blood flow, mean transit time, and Tmax images were obtained utilizing the Rapid software protocol. A limited CT angiogram of the head was also performed to measure the blood vessel density. The radiation dose reduction device was turned on for each scan per the ALARA (As Low as Reasonably Achievable) protocol. Findings:  Cerebral blood flow, blood volume, and mean transit time maps are symmetric without large perfusion defect.  CBF<30% volume: 0mL Tmax>6sec volume: 0 mL Mismatch volume: 0mL Mismatch ratio: None        1. No evidence of core infarct nor ischemic brain at risk. Electronically Signed: Diego Martinez MD  3/29/2025 7:35 PM EDT  Workstation ID: [id number]    CT Abdomen Pelvis With Contrast  Result Date: 3/29/2025  CT ABDOMEN PELVIS W CONTRAST Date of Exam: 3/29/2025 6:51 PM EDT Indication: Vomiting diarrhea, abdominal pain. Comparison: None available. Technique: Axial CT images were obtained of the abdomen and pelvis following the uneventful intravenous administration of 125 cc Isovue-370. Reconstructed coronal and sagittal images were also obtained. Automated exposure control and iterative construction methods were used.  Findings: LUNG BASES: Minimal basal atelectasis/parenchymal scarring. LIVER: Few scattered subcentimeter hypodensities likely representing cysts although too small to adequately characterize. BILIARY/GALLBLADDER:  Unremarkable SPLEEN:  Unremarkable PANCREAS:  Unremarkable ADRENAL:  Unremarkable KIDNEYS:  Unremarkable parenchyma with no solid mass identified. No obstruction.  No calculus identified. GASTROINTESTINAL/MESENTERY:  No evidence of obstruction nor inflammation.  The appendix is normal. MESENTERIC VESSELS:  Patent. AORTA/IVC:  Normal caliber. RETROPERITONEUM/LYMPH NODES:  Unremarkable REPRODUCTIVE:  Unremarkable BLADDER: There is mild nonspecific urinary bladder mural thickening. Correlate for signs of cystitis. OSSEUS STRUCTURES: No acute osseous process identified. Chronic posttraumatic changes involving the right femoral neck related to previous fracture. Potential old left femoral neck fracture as well. There is generalized osteopenia and degenerative change  throughout the lumbar spine. Absence versus resection of the distal coccyx. Correlate with history.     Impression: 1.Mild nonspecific urinary bladder mural thickening. Correlate for signs of cystitis. 2.No acute inflammatory process identified in the abdomen or pelvis. 3.Chronic changes as above. Electronically Signed: Diego Martinez MD  3/29/2025 7:26 PM EDT  Workstation ID: [id number]    CT Head Without Contrast Stroke Protocol  Result Date: 3/29/2025  CT HEAD WO CONTRAST STROKE PROTOCOL Date of Exam: 3/29/2025 6:38 PM EDT Indication: Neuro deficit, acute, stroke suspected Neuro Deficit, acute, Stroke suspected. Comparison: None available. Technique: Axial CT images were obtained of the head without contrast administration.  Reconstructed coronal images were also obtained. Automated exposure control and iterative construction methods were used. Scan Time: 6:42 p.m. Results discussed with stroke navigator at 6:48 p.m. Findings: There is no evidence  of acute territorial infarction. There is no acute intracranial hemorrhage. There are no extra-axial collections. Ventricles and CSF spaces are symmetric. No mass effect nor hydrocephalus. Brain parenchyma appears normal for age.  There is scattered mucosal thickening within the inferior recesses of the maxillary sinuses.  Osseous structures and orbits appear intact.     Impression: No acute intracranial findings. Electronically Signed: Diego Martinez MD  3/29/2025 6:48 PM EDT  Workstation ID: [id number]              Results for orders placed during the hospital encounter of 03/29/25    Adult Transthoracic Echo Complete W/ Cont if Necessary Per Protocol (With Agitated Saline)    Interpretation Summary    Left ventricular systolic function is normal. Calculated left ventricular EF = 61.8% Normal left ventricular cavity size and wall thickness noted. All left ventricular wall segments contract normally. Normal left atrial pressure.    Normal right ventricular cavity size and systolic function noted.    Saline test for shunting not performed. Patient study was not clear enough to add the bubble study.    The aortic valve exhibits sclerosis without stenosis. The aortic valve appears trileaflet. No aortic valve regurgitation is present.    No mitral valve regurgitation or significant stenosis is present. Mild mitral annular calcification is present.    The tricuspid valve is structurally normal with no significant regurgitation or significant stenosis present.      Plan for Follow-up of Pending Labs/Results:     Discharge Details        Discharge Medications        New Medications        Instructions Start Date   cefdinir 300 MG capsule  Commonly known as: OMNICEF   300 mg, Oral, 2 Times Daily   Start Date: April 2, 2025     doxycycline 100 MG capsule  Commonly known as: MONODOX   100 mg, Oral, Every 12 Hours Scheduled      guaiFENesin 600 MG 12 hr tablet  Commonly known as: MUCINEX   600 mg, Oral, Every 12 Hours  Scheduled             Changes to Medications        Instructions Start Date   BASAGLAR KWIKPEN 100 UNIT/ML injection pen  What changed:   how much to take  when to take this  additional instructions   10 Units, Subcutaneous, Daily, Start at 10 units daily Increase by 2 units every 2 days until patient's morning BG is running < 140. Defer further BG management to facility provider. Patient previously on 20 units two times daily.             Continue These Medications        Instructions Start Date   acetaminophen 500 MG tablet  Commonly known as: TYLENOL   1,000 mg, 3 Times Daily PRN      amLODIPine 5 MG tablet  Commonly known as: NORVASC   5 mg, Oral, Daily      carvedilol 12.5 MG tablet  Commonly known as: COREG   12.5 mg, 2 Times Daily With Meals      cetirizine 10 MG tablet  Commonly known as: zyrTEC   10 mg, Daily      Diclofenac Sodium 1 % gel gel  Commonly known as: VOLTAREN   4 g, Topical, As Needed      Divalproex Sodium 125 MG capsule  Commonly known as: DEPAKOTE SPRINKLE   250 mg, Nightly      Dulcolax 10 MG suppository  Generic drug: bisacodyl   10 mg, Rectal, As Needed      ferrous sulfate 325 (65 FE) MG tablet   325 mg, Daily With Breakfast      fluticasone 50 MCG/ACT nasal spray  Commonly known as: FLONASE   2 sprays, Nasal, Daily      hydrALAZINE 50 MG tablet  Commonly known as: APRESOLINE   50 mg, 2 Times Daily      icosapent ethyl 1 g capsule capsule  Commonly known as: VASCEPA   1 g, 2 Times Daily With Meals      ipratropium-albuterol 0.5-2.5 mg/3 ml nebulizer  Commonly known as: DUO-NEB   3 mL, Every 4 Hours PRN      lurasidone 40 MG tablet tablet  Commonly known as: LATUDA   40 mg, Daily      naphazoline-pheniramine 0.025-0.3 % ophthalmic solution  Commonly known as: NAPHCON-A   2 drops, Both Eyes, 4 Times Daily PRN      ondansetron ODT 4 MG disintegrating tablet  Commonly known as: ZOFRAN-ODT   4 mg, Every 8 Hours PRN      pantoprazole 40 MG EC tablet  Commonly known as: PROTONIX   40 mg, Oral,  Daily      polyethylene glycol 17 GM/SCOOP powder  Commonly known as: MIRALAX   17 g, Oral, Daily PRN      risperiDONE 0.25 MG tablet  Commonly known as: risperDAL   0.25 mg, 3 Times Daily PRN      risperiDONE 2 MG tablet  Commonly known as: risperDAL   2 mg, Every 12 Hours Scheduled      rosuvastatin 10 MG tablet  Commonly known as: CRESTOR   10 mg, Nightly      Selsun Blue Itchy Dry Scalp 1 % shampoo  Generic drug: pyrithione zinc   1 Application, Topical, Daily PRN      traZODone 50 MG tablet  Commonly known as: DESYREL   25 mg, Nightly      Vitamin D3 50 MCG (2000 UT) capsule   2,000 Units, Oral, Daily             Stop These Medications      senna 8.6 MG tablet  Commonly known as: SENOKOT              Allergies   Allergen Reactions    Other          Discharge Disposition:  Long Term Care (DC - External)    Diet:  Hospital:  Diet Order   Procedures    Diet: Cardiac, Diabetic; Healthy Heart (2-3 Na+); Consistent Carbohydrate; Texture: Regular (IDDSI 7); Fluid Consistency: Thin (IDDSI 0)       Diet Instructions       Diet: Cardiac Diets, Diabetic Diets; Healthy Heart (2-3 Na+); Regular (IDDSI 7); Thin (IDDSI 0); Consistent Carbohydrate      Discharge Diet:  Cardiac Diets  Diabetic Diets       Cardiac Diet: Healthy Heart (2-3 Na+)    Texture: Regular (IDDSI 7)    Fluid Consistency: Thin (IDDSI 0)    Diabetic Diet: Consistent Carbohydrate             Activity:  Activity Instructions       Activity as Tolerated              Restrictions or Other Recommendations:         CODE STATUS:    Code Status and Medical Interventions: CPR (Attempt to Resuscitate); Full Support   Ordered at: 03/29/25 2200     Code Status (Patient has no pulse and is not breathing):    CPR (Attempt to Resuscitate)     Medical Interventions (Patient has pulse or is breathing):    Full Support     Level Of Support Discussed With:    Next of Kin (If No Surrogate)       Health Care Surrogate       Future Appointments   Date Time Provider Department  Center   4/1/2025  7:30 PM MED 11 [facility] None   4/10/2025  8:00 AM [facility]  [facility]       Additional Instructions for the Follow-ups that You Need to Schedule       Discharge Follow-up with Specified Provider: Follow up [facility] facility provider 1-2 days   As directed      To: Follow up [facility] facility provider 1-2 days                    PIERRE Quiroz  04/01/25      Time Spent on Discharge:  I spent  40  minutes on this discharge activity which included: face-to-face encounter with the patient, reviewing the data in the system, coordination of the care with the nursing staff as well as consultants, documentation, and entering orders.            Electronically signed by Qi Black APRN at 04/01/25 7338

## 2025-04-08 LAB
QT INTERVAL: 418 MS
QTC INTERVAL: 508 MS